# Patient Record
Sex: MALE | Race: WHITE | NOT HISPANIC OR LATINO | Employment: OTHER | URBAN - METROPOLITAN AREA
[De-identification: names, ages, dates, MRNs, and addresses within clinical notes are randomized per-mention and may not be internally consistent; named-entity substitution may affect disease eponyms.]

---

## 2017-11-30 ENCOUNTER — HOSPITAL ENCOUNTER (OUTPATIENT)
Facility: HOSPITAL | Age: 82
LOS: 1 days | Discharge: HOME | End: 2017-12-01
Attending: HOSPITALIST | Admitting: HOSPITALIST

## 2017-11-30 PROBLEM — N40.0 BPH (BENIGN PROSTATIC HYPERPLASIA): Status: ACTIVE | Noted: 2017-11-30

## 2017-11-30 PROBLEM — J44.9 COPD (CHRONIC OBSTRUCTIVE PULMONARY DISEASE) (HCC): Status: ACTIVE | Noted: 2017-11-30

## 2017-11-30 PROBLEM — Z90.5 HISTORY OF NEPHRECTOMY: Status: ACTIVE | Noted: 2017-11-30

## 2017-11-30 PROBLEM — E89.2 HYPOPARATHYROIDISM AFTER PROCEDURE (HCC): Status: ACTIVE | Noted: 2017-11-30

## 2017-11-30 PROBLEM — E89.0 STATUS POST COMPLETE THYROIDECTOMY: Status: ACTIVE | Noted: 2017-11-30

## 2017-11-30 PROBLEM — E89.2 POSTSURGICAL HYPOPARATHYROIDISM: Status: ACTIVE | Noted: 2017-11-30

## 2017-11-30 LAB
ALBUMIN SERPL-MCNC: 3.6 G/DL (ref 3.5–5.2)
ALBUMIN/GLOB SERPL: 1.1 G/DL
ALP SERPL-CCNC: 40 U/L (ref 39–117)
ALT SERPL W P-5'-P-CCNC: 16 U/L (ref 1–41)
ANION GAP SERPL CALCULATED.3IONS-SCNC: 11.9 MMOL/L
AST SERPL-CCNC: 23 U/L (ref 1–40)
BASOPHILS # BLD AUTO: 0 10*3/MM3 (ref 0–0.2)
BASOPHILS NFR BLD AUTO: 0 % (ref 0–1.5)
BILIRUB SERPL-MCNC: 0.3 MG/DL (ref 0.1–1.2)
BUN BLD-MCNC: 19 MG/DL (ref 8–23)
BUN/CREAT SERPL: 19.8 (ref 7–25)
CALCIUM SPEC-SCNC: 9.2 MG/DL (ref 8.2–9.6)
CHLORIDE SERPL-SCNC: 102 MMOL/L (ref 98–107)
CO2 SERPL-SCNC: 25.1 MMOL/L (ref 22–29)
CREAT BLD-MCNC: 0.96 MG/DL (ref 0.76–1.27)
DEPRECATED RDW RBC AUTO: 49.5 FL (ref 37–54)
EOSINOPHIL # BLD AUTO: 0 10*3/MM3 (ref 0–0.7)
EOSINOPHIL NFR BLD AUTO: 0 % (ref 0.3–6.2)
ERYTHROCYTE [DISTWIDTH] IN BLOOD BY AUTOMATED COUNT: 14.3 % (ref 11.5–14.5)
GFR SERPL CREATININE-BSD FRML MDRD: 74 ML/MIN/1.73
GLOBULIN UR ELPH-MCNC: 3.2 GM/DL
GLUCOSE BLD-MCNC: 151 MG/DL (ref 65–99)
HCT VFR BLD AUTO: 39.4 % (ref 40.4–52.2)
HGB BLD-MCNC: 12.4 G/DL (ref 13.7–17.6)
IMM GRANULOCYTES # BLD: 0 10*3/MM3 (ref 0–0.03)
IMM GRANULOCYTES NFR BLD: 0 % (ref 0–0.5)
LYMPHOCYTES # BLD AUTO: 0.6 10*3/MM3 (ref 0.9–4.8)
LYMPHOCYTES NFR BLD AUTO: 9.9 % (ref 19.6–45.3)
MAGNESIUM SERPL-MCNC: 1.6 MG/DL (ref 1.6–2.4)
MCH RBC QN AUTO: 30 PG (ref 27–32.7)
MCHC RBC AUTO-ENTMCNC: 31.5 G/DL (ref 32.6–36.4)
MCV RBC AUTO: 95.4 FL (ref 79.8–96.2)
MONOCYTES # BLD AUTO: 0.38 10*3/MM3 (ref 0.2–1.2)
MONOCYTES NFR BLD AUTO: 6.3 % (ref 5–12)
NEUTROPHILS # BLD AUTO: 5.09 10*3/MM3 (ref 1.9–8.1)
NEUTROPHILS NFR BLD AUTO: 83.8 % (ref 42.7–76)
PHOSPHATE SERPL-MCNC: 2.6 MG/DL (ref 2.5–4.5)
PLATELET # BLD AUTO: 180 10*3/MM3 (ref 140–500)
PMV BLD AUTO: 10.1 FL (ref 6–12)
POTASSIUM BLD-SCNC: 3.5 MMOL/L (ref 3.5–5.2)
PROT SERPL-MCNC: 6.8 G/DL (ref 6–8.5)
PTH-INTACT SERPL-MCNC: 13.3 PG/ML (ref 15–65)
RBC # BLD AUTO: 4.13 10*6/MM3 (ref 4.6–6)
SODIUM BLD-SCNC: 139 MMOL/L (ref 136–145)
WBC NRBC COR # BLD: 6.07 10*3/MM3 (ref 4.5–10.7)

## 2017-11-30 PROCEDURE — 80053 COMPREHEN METABOLIC PANEL: CPT | Performed by: HOSPITALIST

## 2017-11-30 PROCEDURE — 83970 ASSAY OF PARATHORMONE: CPT | Performed by: HOSPITALIST

## 2017-11-30 PROCEDURE — 85025 COMPLETE CBC W/AUTO DIFF WBC: CPT | Performed by: HOSPITALIST

## 2017-11-30 PROCEDURE — 84100 ASSAY OF PHOSPHORUS: CPT | Performed by: HOSPITALIST

## 2017-11-30 PROCEDURE — 83735 ASSAY OF MAGNESIUM: CPT | Performed by: HOSPITALIST

## 2017-11-30 RX ORDER — FENOFIBRATE 145 MG/1
160 TABLET, COATED ORAL DAILY
COMMUNITY
End: 2019-10-03 | Stop reason: SDUPTHER

## 2017-11-30 RX ORDER — LISINOPRIL 10 MG/1
10 TABLET ORAL DAILY
COMMUNITY
End: 2019-10-03 | Stop reason: SDUPTHER

## 2017-11-30 RX ORDER — ONDANSETRON 2 MG/ML
4 INJECTION INTRAMUSCULAR; INTRAVENOUS EVERY 6 HOURS PRN
Status: DISCONTINUED | OUTPATIENT
Start: 2017-11-30 | End: 2017-12-01 | Stop reason: HOSPADM

## 2017-11-30 RX ORDER — TAMSULOSIN HYDROCHLORIDE 0.4 MG/1
1 CAPSULE ORAL NIGHTLY
COMMUNITY

## 2017-11-30 RX ORDER — NITROGLYCERIN 0.4 MG/1
0.4 TABLET SUBLINGUAL
Status: DISCONTINUED | OUTPATIENT
Start: 2017-11-30 | End: 2017-12-01 | Stop reason: HOSPADM

## 2017-11-30 RX ORDER — OMEPRAZOLE 20 MG/1
20 CAPSULE, DELAYED RELEASE ORAL DAILY
COMMUNITY
End: 2021-04-23

## 2017-11-30 RX ORDER — LISINOPRIL 10 MG/1
10 TABLET ORAL DAILY
Status: DISCONTINUED | OUTPATIENT
Start: 2017-12-01 | End: 2017-12-01 | Stop reason: HOSPADM

## 2017-11-30 RX ORDER — PANTOPRAZOLE SODIUM 40 MG/1
40 TABLET, DELAYED RELEASE ORAL EVERY MORNING
Status: DISCONTINUED | OUTPATIENT
Start: 2017-12-01 | End: 2017-12-01 | Stop reason: HOSPADM

## 2017-11-30 RX ORDER — ACETAMINOPHEN 325 MG/1
650 TABLET ORAL EVERY 6 HOURS PRN
Status: DISCONTINUED | OUTPATIENT
Start: 2017-11-30 | End: 2017-12-01 | Stop reason: HOSPADM

## 2017-11-30 RX ORDER — TAMSULOSIN HYDROCHLORIDE 0.4 MG/1
0.4 CAPSULE ORAL NIGHTLY
Status: DISCONTINUED | OUTPATIENT
Start: 2017-12-01 | End: 2017-12-01 | Stop reason: HOSPADM

## 2017-11-30 RX ORDER — CALCIUM CARBONATE 200(500)MG
2 TABLET,CHEWABLE ORAL 4 TIMES DAILY
Status: DISCONTINUED | OUTPATIENT
Start: 2017-11-30 | End: 2017-12-01 | Stop reason: HOSPADM

## 2017-12-01 VITALS
RESPIRATION RATE: 16 BRPM | SYSTOLIC BLOOD PRESSURE: 145 MMHG | TEMPERATURE: 97.6 F | HEIGHT: 70 IN | BODY MASS INDEX: 20.31 KG/M2 | OXYGEN SATURATION: 97 % | HEART RATE: 75 BPM | DIASTOLIC BLOOD PRESSURE: 72 MMHG | WEIGHT: 141.9 LBS

## 2017-12-01 PROBLEM — E83.51 HYPOCALCEMIA: Status: ACTIVE | Noted: 2017-12-01

## 2017-12-01 LAB
ANION GAP SERPL CALCULATED.3IONS-SCNC: 9.1 MMOL/L
BUN BLD-MCNC: 17 MG/DL (ref 8–23)
BUN/CREAT SERPL: 17.5 (ref 7–25)
CALCIUM SPEC-SCNC: 9.5 MG/DL (ref 8.2–9.6)
CHLORIDE SERPL-SCNC: 103 MMOL/L (ref 98–107)
CO2 SERPL-SCNC: 25.9 MMOL/L (ref 22–29)
CREAT BLD-MCNC: 0.97 MG/DL (ref 0.76–1.27)
GFR SERPL CREATININE-BSD FRML MDRD: 73 ML/MIN/1.73
GLUCOSE BLD-MCNC: 97 MG/DL (ref 65–99)
POTASSIUM BLD-SCNC: 3.6 MMOL/L (ref 3.5–5.2)
PTH-INTACT SERPL-MCNC: 12.1 PG/ML (ref 15–65)
SODIUM BLD-SCNC: 138 MMOL/L (ref 136–145)

## 2017-12-01 PROCEDURE — G0378 HOSPITAL OBSERVATION PER HR: HCPCS

## 2017-12-01 PROCEDURE — 83970 ASSAY OF PARATHORMONE: CPT | Performed by: HOSPITALIST

## 2017-12-01 PROCEDURE — 99204 OFFICE O/P NEW MOD 45 MIN: CPT | Performed by: INTERNAL MEDICINE

## 2017-12-01 PROCEDURE — 80048 BASIC METABOLIC PNL TOTAL CA: CPT | Performed by: HOSPITALIST

## 2017-12-01 RX ORDER — LEVOTHYROXINE SODIUM 0.07 MG/1
75 TABLET ORAL DAILY
Qty: 30 TABLET | Refills: 0 | Status: SHIPPED | OUTPATIENT
Start: 2017-12-01 | End: 2021-10-08 | Stop reason: ALTCHOICE

## 2017-12-01 RX ORDER — LEVOTHYROXINE SODIUM 0.07 MG/1
75 TABLET ORAL
Status: DISCONTINUED | OUTPATIENT
Start: 2017-12-01 | End: 2017-12-01 | Stop reason: HOSPADM

## 2017-12-01 RX ORDER — CALCIUM CARBONATE 200(500)MG
2 TABLET,CHEWABLE ORAL 4 TIMES DAILY
Start: 2017-12-01 | End: 2021-04-23

## 2017-12-01 RX ADMIN — Medication 2 TABLET: at 01:31

## 2017-12-01 RX ADMIN — LISINOPRIL 10 MG: 10 TABLET ORAL at 09:04

## 2017-12-01 RX ADMIN — TAMSULOSIN HYDROCHLORIDE 0.4 MG: 0.4 CAPSULE ORAL at 01:31

## 2017-12-01 RX ADMIN — Medication 2 TABLET: at 12:10

## 2017-12-01 RX ADMIN — LEVOTHYROXINE SODIUM 75 MCG: 75 TABLET ORAL at 10:20

## 2017-12-01 RX ADMIN — PANTOPRAZOLE SODIUM 40 MG: 40 TABLET, DELAYED RELEASE ORAL at 06:03

## 2017-12-01 RX ADMIN — Medication 2 TABLET: at 09:04

## 2017-12-01 NOTE — PROGRESS NOTES
Case Management Discharge Note    Final Note: Discharged  12/1/17 to home    Discharge Placement     No information found        Other: Other (family provided transportation at discharge)    Discharge Codes: 01  Discharge to home

## 2017-12-01 NOTE — PROGRESS NOTES
Discharge Planning Assessment  [facility]     Patient Name: Jase Cardoza  MRN: [id number]  Today's Date: 12/1/2017    Admit Date: 11/30/2017          Discharge Needs Assessment       12/01/17 0933    Living Environment    Lives With alone    Living Arrangements house   single story    Home Accessibility no concerns;stairs to enter home    Number of Stairs to Enter Home 3    Type of Financial/Environmental Concern none    Transportation Available car;family or friend will provide    Living Environment    Provides Primary Care For no one    Quality Of Family Relationships supportive    Able to Return to Prior Living Arrangements yes    Discharge Needs Assessment    Concerns To Be Addressed no discharge needs identified;denies needs/concerns at this time    Readmission Within The Last 30 Days no previous admission in last 30 days    Anticipated Changes Related to Illness none    Equipment Currently Used at Home cane, straight    Equipment Needed After Discharge none    Discharge Disposition home or self-care            Discharge Plan       12/01/17 0994    Case Management/Social Work Plan    Plan Home with family assistance    Patient/Family In Agreement With Plan yes    Additional Comments Introduced self and explained role of CCP and facesheet verified with patient, who is alert and oriented x 4, and Cora francis, with patient's permission, at bedside.  Patient states he lives alone in a single story house and is independent with all ADLs including medications, driving, cooking, ambulation, and states his daughter assist with housekeeping as needed.  Patient states he has never used home health or SNF and denies need for home health or SNF or DME at discharge and states he has a straight cane, but never uses it.  Patient states he uses [facility] in [place], [place] and denies any issues with affording or obtaining medications.  Patient and daughter state the plan is for patient to return home and she and  her family live within 1 block of patient and will be there to assist and check on him frequently throughout the day at discharge and she will provide transportation at discharge.....Danae Jurado RN,[facility]         Discharge Placement     No information found                Demographic Summary       12/01/17 0932    Referral Information    Arrived From home or self-care    Contact Information    Permission Granted to Share Information With family/designee   Cora Duffy(daughter)  [phone]    Primary Care Physician Information    Name MARIAM Wise MD            Functional Status       12/01/17 0932    Functional Status Current    Ambulation 0-->independent    Transferring 0-->independent    Toileting 0-->independent    Bathing 0-->independent    Dressing 0-->independent    Eating 0-->independent    Communication 0-->understands/communicates without difficulty    Change in Functional Status Since Onset of Current Illness/Injury no    Functional Status Prior    Ambulation 0-->independent    Transferring 0-->independent    Toileting 0-->independent    Bathing 0-->independent    Dressing 0-->independent    Eating 0-->independent    Communication 0-->understands/communicates without difficulty    IADL    Medications independent    Meal Preparation independent    Housekeeping assistive person    Laundry independent    Shopping independent    Oral Care independent    Activity Tolerance    Current Activity Limitations none    Usual Activity Tolerance good    Current Activity Tolerance good    Cognitive/Perceptual/Developmental    Current Mental Status/Cognitive Functioning no deficits noted    Recent Changes in Mental Status/Cognitive Functioning no changes    Developmental Stage (Eriksson's Stages of Development) Stage 8 (65 years-death/Late Adulthood) Integrity vs. Despair            Psychosocial     None            Abuse/Neglect     None            Legal     None            Substance Abuse     None            Patient  Forms     None          Danae Jurado RN

## 2017-12-01 NOTE — DISCHARGE SUMMARY
[facility]    Date of Discharge:  12/1/2017    PCP: Oxana Wise MD    Discharge Diagnosis:   Active Hospital Problems (** Indicates Principal Problem)    Diagnosis Date Noted   • **Postsurgical hypoparathyroidism [E89.2] 11/30/2017   • Hypocalcemia [E83.51] 12/01/2017   • Status post complete thyroidectomy [E89.0] 11/30/2017   • COPD (chronic obstructive pulmonary disease) [J44.9] 11/30/2017   • BPH (benign prostatic hyperplasia) [N40.0] 11/30/2017   • History of nephrectomy [Z90.5] 11/30/2017      Resolved Hospital Problems    Diagnosis Date Noted Date Resolved   No resolved problems to display.     Procedures Performed       Consulting Physician(s)     Provider Relationship Specialty    Rashaad EAST MD Consulting Physician Endocrinology    Cristobal Mchugh MD Consulting Physician Otolaryngology        Hospital Course  Please see history and physical for details. Patient is a 89 y.o. male initially admitted by one of my partners Dr. No.  Patient apparently had right-sided hemithyroidectomy performed in an outpatient setting by .  Pathology from previous partial thyroidectomy demonstrated carcinoma and endocrinology recommended for total thyroidectomy and postoperative radioactive iodine treatments.  Total thyroidectomy was performed and apparently patient had some abnormal parathyroid hormone levels so DR Arredondo asked that he be admitted to the hospital for observation.  Despite abnormalities on PTH his calcium levels are normal and this is secondary to the fact that he takes calcium at least 4 times daily by mouth.  He's voicing no complaints at this juncture does not have any issues with dysphagia nor is he having any nausea vomiting headache fever chills or night sweats.  He is not having issues with confusion either and is interactive and pleasant.  No family present at bedside nurse is present at bedside the case was  discussed.  At this juncture endocrinology did see the patient in consultation and appreciate the assistance of Dr. Collier.  He has been supplemented on level thyroxine at 75 µg and should continue with use of Tums 4 times daily.  Patient artery has scheduled follow-up with his endocrinologist in the next 10 days and also has scheduled follow-up with his surgeon as well.  Plans per further his current tube to be removed by home health so I asked the RN to contact  to inform him of output indeterminate during can be removed prior to disposition.  All questions were answered to patient prior to disposition and is very thankful amenable to the above plan.    Condition on Discharge: Improved    Temp:  [97.6 °F (36.4 °C)-98.2 °F (36.8 °C)] 97.6 °F (36.4 °C)  Heart Rate:  [69-79] 75  Resp:  [16] 16  BP: (132-163)/(69-87) 145/72  Body mass index is 20.36 kg/(m^2).    Physical Exam   Constitutional: He is oriented to person, place, and time. He appears well-developed.   Eating lunch without any signs of dysphagia   Neck:   Previous surgical scar noted anterior portion of the neck that is healing appropriately and it is clean and dry without signs of infection.  Patient still has a drain in place with minimal serosanguineous output   Cardiovascular: Normal rate and regular rhythm.    Pulmonary/Chest: Effort normal and breath sounds normal.   Abdominal: Soft. Bowel sounds are normal. He exhibits no distension. There is no tenderness.   Musculoskeletal: He exhibits no edema.   Neurological: He is alert and oriented to person, place, and time. No cranial nerve deficit.     Discharge Medications   Jase Cardoza   Home Medication Instructions JENNIFER:[id number]    Printed on:12/01/17 1323   Medication Information                      calcium carbonate (TUMS) 500 MG chewable tablet  Chew 1,000 mg 4 (Four) Times a Day.             fenofibrate (TRICOR) 145 MG tablet  Take 160 mg by mouth Daily.              levothyroxine (SYNTHROID, LEVOTHROID) 75 MCG tablet  Take 1 tablet by mouth Daily.             lisinopril (PRINIVIL,ZESTRIL) 10 MG tablet  Take 10 mg by mouth Daily.             omeprazole (priLOSEC) 20 MG capsule  Take 20 mg by mouth Daily.             tamsulosin (FLOMAX) 0.4 MG capsule 24 hr capsule  Take 1 capsule by mouth Every Night.                  Additional Instructions for the Follow-ups that You Need to Schedule     Discharge Follow-up with PCP    As directed    Follow Up Details:  keep all appts prior to this admission             Follow-up Information     Follow up with Oxana Wise MD .    Specialty:  Family Medicine    Why:  keep all appts prior to this admission    Contact information:    [address] [facility] [address] [phone]          Test Results Pending at Discharge     Ozzie Miranda MD  12/01/17  1:23 PM    Discharge time spent greater than 30 minutes.

## 2017-12-01 NOTE — PLAN OF CARE
Problem: Patient Care Overview (Adult)  Goal: Plan of Care Review  Outcome: Ongoing (interventions implemented as appropriate)    12/01/17 0040 12/01/17 0217   Patient Care Overview   Progress --  no change   Outcome Evaluation   Outcome Summary/Follow up Plan --  pt caome to the floor aroufn 2100 last night. Pt hasnt complained of any pain. Drain has little drainage and tube has been changed Q4 hr. PTH lvl has is now 13.3 and called in to Doc. Willl continue to monitor.    Coping/Psychosocial Response Interventions   Plan Of Care Reviewed With patient --        Goal: Adult Individualization and Mutuality  Outcome: Ongoing (interventions implemented as appropriate)  Goal: Discharge Needs Assessment  Outcome: Ongoing (interventions implemented as appropriate)    Problem: Fall Risk (Adult)  Goal: Identify Related Risk Factors and Signs and Symptoms  Outcome: Ongoing (interventions implemented as appropriate)  Goal: Absence of Falls  Outcome: Ongoing (interventions implemented as appropriate)    Problem: Infection, Risk/Actual (Adult)  Goal: Identify Related Risk Factors and Signs and Symptoms  Outcome: Ongoing (interventions implemented as appropriate)  Goal: Infection Prevention/Resolution  Outcome: Ongoing (interventions implemented as appropriate)

## 2017-12-01 NOTE — CONSULTS
89 y.o.  Patient Care Team:  No Known Provider as PCP - General      No chief complaint on file.  Postoperative hypoparathyroidism, papillary thyroid cancer, status post completion thyroidectomy and postoperative hypothyroidism    HPI   Patient is a 89-year-old white male presented to the hospital after having elective surgery yesterday.  He had a completion thyroidectomy and is intraoperative PTH was noted to be low.  Patient's calcium is stable but due to concerns for hypocalcemia postoperatively patient was admitted to the hospital for further monitoring  Patient's PTH was 13 intraoperatively    Patient reports that in July this year he was noted to have a right-sided thyroid nodule which was further evaluated by FNA in the malignancy was suspected and he underwent right hemithyroidectomy on September 14, 2017.  Subsequently he was found to have papillary thyroid cancer and was recommended completion thyroidectomy.  Patient wanted a second opinion and he saw an endocrinologist Dr. Reynaga who apparently recommended completion thyroidectomy along with radioiodine ablation in the near future.    Patient underwent completion thyroidectomy yesterday.  He denies any knowledge of calcium imbalance prior to surgery.  He also denies any knowledge of hypoparathyroidism prior to surgery  Patient denies any history of kidney stones.  Currently patient denies any symptoms of paresthesias or muscle spasms.  Patient has been ambulatory since surgery and has been walking around the nursing Station several times during the day  Patient currently has a drain tube from the surgical site.  Patient wants to go home today        Past Medical History:   Diagnosis Date   • Arthritis    • Cancer    • COPD (chronic obstructive pulmonary disease)    • Disease of thyroid gland    • Kidney calculi    • Prostate enlargement        History reviewed. No pertinent family history.    Social History     Social History   • Marital status:       Spouse name: N/A   • Number of children: N/A   • Years of education: N/A     Occupational History   • Not on file.     Social History Main Topics   • Smoking status: Former Smoker     Types: Cigarettes   • Smokeless tobacco: Not on file      Comment: quit 70 years ago   • Alcohol use No   • Drug use: No   • Sexual activity: Defer     Other Topics Concern   • Not on file     Social History Narrative   • No narrative on file       Allergies   Allergen Reactions   • Sulfa Antibiotics Rash         Current Facility-Administered Medications:   •  acetaminophen (TYLENOL) tablet 650 mg, 650 mg, Oral, Q6H PRN, Brian No MD  •  calcium carbonate (TUMS) chewable tablet 500 mg (200 mg elemental), 2 tablet, Oral, 4x Daily, Brian No MD, 2 tablet at 12/01/17 0131  •  lisinopril (PRINIVIL,ZESTRIL) tablet 10 mg, 10 mg, Oral, Daily, Brian No MD  •  nitroglycerin (NITROSTAT) SL tablet 0.4 mg, 0.4 mg, Sublingual, Q5 Min PRN, Brian No MD  •  ondansetron (ZOFRAN) injection 4 mg, 4 mg, Intravenous, Q6H PRN, Brian No MD  •  pantoprazole (PROTONIX) EC tablet 40 mg, 40 mg, Oral, QAM, Brian No MD, 40 mg at 12/01/17 0603  •  tamsulosin (FLOMAX) 24 hr capsule 0.4 mg, 0.4 mg, Oral, Nightly, Brian No MD, 0.4 mg at 12/01/17 0131         Review of Systems   Constitutional: Negative.    HENT: Positive for trouble swallowing.    Eyes: Negative.    Respiratory: Negative.    Cardiovascular: Negative.    Gastrointestinal: Negative.    Musculoskeletal: Negative.    Skin: Negative.    Neurological: Negative.    Psychiatric/Behavioral: Negative.    All other systems reviewed and are negative.    Objective     Vital Signs  Temp:  [97.8 °F (36.6 °C)-98.2 °F (36.8 °C)] 98.2 °F (36.8 °C)  Heart Rate:  [69-79] 69  Resp:  [16] 16  BP: (132-163)/(69-87) 140/81    Physical Exam  Physical Exam   Constitutional: He is oriented to person, place, and time. He appears well-developed and well-nourished.   HENT:   Head: Normocephalic  and atraumatic.   Eyes: EOM are normal. Pupils are equal, round, and reactive to light.   Neck: Normal range of motion. Neck supple.   Cardiovascular: Normal rate, regular rhythm, normal heart sounds and intact distal pulses.    Pulmonary/Chest: Effort normal and breath sounds normal. No stridor.   Abdominal: Soft. Bowel sounds are normal. He exhibits no distension. There is no tenderness.   Musculoskeletal: Normal range of motion. He exhibits no edema.   Neurological: He is alert and oriented to person, place, and time.   Skin: Skin is warm and dry.   Psychiatric: He has a normal mood and affect. His behavior is normal.   Nursing note and vitals reviewed.      Results Review:    I reviewed the patient's new clinical results.  No results found for: POCGLU  Lab Results (last 72 hours)     Procedure Component Value Units Date/Time    CBC Auto Differential [[id number]]  (Abnormal) Collected:  11/30/17 2302    Specimen:  Blood Updated:  11/30/17 2318     WBC 6.07 10*3/mm3      RBC 4.13 (L) 10*6/mm3      Hemoglobin 12.4 (L) g/dL      Hematocrit 39.4 (L) %      MCV 95.4 fL      MCH 30.0 pg      MCHC 31.5 (L) g/dL      RDW 14.3 %      RDW-SD 49.5 fl      MPV 10.1 fL      Platelets 180 10*3/mm3      Neutrophil % 83.8 (H) %      Lymphocyte % 9.9 (L) %      Monocyte % 6.3 %      Eosinophil % 0.0 (L) %      Basophil % 0.0 %      Immature Grans % 0.0 %      Neutrophils, Absolute 5.09 10*3/mm3      Lymphocytes, Absolute 0.60 (L) 10*3/mm3      Monocytes, Absolute 0.38 10*3/mm3      Eosinophils, Absolute 0.00 10*3/mm3      Basophils, Absolute 0.00 10*3/mm3      Immature Grans, Absolute 0.00 10*3/mm3     Magnesium [[id number]]  (Normal) Collected:  11/30/17 2302    Specimen:  Blood Updated:  11/30/17 2332     Magnesium 1.6 mg/dL     Comprehensive Metabolic Panel [[id number]]  (Abnormal) Collected:  11/30/17 2302    Specimen:  Blood Updated:  11/30/17 2332     Glucose 151 (H) mg/dL      BUN 19 mg/dL      Creatinine 0.96 mg/dL       Sodium 139 mmol/L      Potassium 3.5 mmol/L      Chloride 102 mmol/L      CO2 25.1 mmol/L      Calcium 9.2 mg/dL      Total Protein 6.8 g/dL      Albumin 3.60 g/dL      ALT (SGPT) 16 U/L      AST (SGOT) 23 U/L      Alkaline Phosphatase 40 U/L      Total Bilirubin 0.3 mg/dL      eGFR Non African Amer 74 mL/min/1.73      Globulin 3.2 gm/dL      A/G Ratio 1.1 g/dL      BUN/Creatinine Ratio 19.8     Anion Gap 11.9 mmol/L     Narrative:       The MDRD GFR formula is only valid for adults with stable renal function between ages 18 and 70.    Phosphorus [[id number]]  (Normal) Collected:  11/30/17 2302    Specimen:  Blood Updated:  11/30/17 2332     Phosphorus 2.6 mg/dL     PTH, Intact [[id number]]  (Abnormal) Collected:  11/30/17 2302    Specimen:  Blood Updated:  11/30/17 2341     PTH, Intact 13.3 (L) pg/mL     Basic Metabolic Panel [[id number]] Collected:  12/01/17 0412    Specimen:  Blood Updated:  12/01/17 0502     Glucose 97 mg/dL      BUN 17 mg/dL      Creatinine 0.97 mg/dL      Sodium 138 mmol/L      Potassium 3.6 mmol/L      Chloride 103 mmol/L      CO2 25.9 mmol/L      Calcium 9.5 mg/dL      eGFR Non African Amer 73 mL/min/1.73      BUN/Creatinine Ratio 17.5     Anion Gap 9.1 mmol/L     Narrative:       The MDRD GFR formula is only valid for adults with stable renal function between ages 18 and 70.    PTH, Intact [[id number]]  (Abnormal) Collected:  12/01/17 0412    Specimen:  Blood Updated:  12/01/17 0517     PTH, Intact 12.1 (L) pg/mL           Imaging Results (last 72 hours)     ** No results found for the last 72 hours. **          Assessment/Plan     Patient Active Problem List   Diagnosis   • Status post complete thyroidectomy   • Postsurgical hypoparathyroidism   • COPD (chronic obstructive pulmonary disease)   • BPH (benign prostatic hyperplasia)   • History of nephrectomy   Postoperative hypothyroidism  Papillary thyroid cancer    Patient's calcium is stable at 9.5 this morning.  Patient is asymptomatic even  "though his PTH levels are in the tall and 13 range postoperatively  Patient is currently on calcium replacement receiving Tums 2 tablets every 6 hours    I discussed further management of postoperative hypoparathyroidism and possible hypocalcemia with the patient  For the time being he might continue the Tums.    If the calcium continues to drop then he may need further medication including Sensipar, high-dose vitamin D, NATPARA as other options    Postoperative hypothyroidism  Patient will most certainly replacement lifelong  TSH suppression from the goals discussed with the patient  Further decisions can be made based on surgical pathology which is still pending  Patient also believes that he will undergo radioactive iodine ablation in 5-6 weeks through his primary endocrinologist  Patient has a follow-up appointment on December 12, 2017 with his endocrinologist    Until then I recommend that he start levothyroxine 75 µg daily on an empty stomach  He will continue Tums 2 tablets 4 times daily starting after breakfast  Patient is advised to immediately report if he is symptomatic of tingling or numbness over the face or arms or legs and also if he has any muscle spasms  Patient verbalized understanding    I recommend checking calcium levels at noon today and if greater than 9.0 then he may be able to go home on current medication  Patient is advised to follow-up with his endocrinologist as scheduled in the next 10 days    Thank you for the consult    Rashaad Carbajal MD FACE.  12/01/17  8:45 AM        EMR Dragon / transcription disclaimer:     \"Dictated utilizing Dragon dictation\".   "

## 2017-12-01 NOTE — PLAN OF CARE
Problem: Patient Care Overview (Adult)  Goal: Plan of Care Review  Outcome: Ongoing (interventions implemented as appropriate)    12/01/17 1416   Patient Care Overview   Progress improving   Outcome Evaluation   Outcome Summary/Follow up Plan plan to discharge home   Coping/Psychosocial Response Interventions   Plan Of Care Reviewed With patient;family       Goal: Adult Individualization and Mutuality  Outcome: Ongoing (interventions implemented as appropriate)  Goal: Discharge Needs Assessment  Outcome: Ongoing (interventions implemented as appropriate)    Problem: Fall Risk (Adult)  Goal: Identify Related Risk Factors and Signs and Symptoms  Outcome: Ongoing (interventions implemented as appropriate)  Goal: Absence of Falls  Outcome: Ongoing (interventions implemented as appropriate)    Problem: Infection, Risk/Actual (Adult)  Goal: Identify Related Risk Factors and Signs and Symptoms  Outcome: Ongoing (interventions implemented as appropriate)  Goal: Infection Prevention/Resolution  Outcome: Ongoing (interventions implemented as appropriate)

## 2018-12-25 ENCOUNTER — ANESTHESIA (OUTPATIENT)
Dept: PERIOP | Facility: HOSPITAL | Age: 83
End: 2018-12-25

## 2018-12-25 PROCEDURE — C1713 ANCHOR/SCREW BN/BN,TIS/BN: HCPCS | Performed by: ORTHOPAEDIC SURGERY

## 2018-12-25 DEVICE — KWIRE THRD TROC/TP 1.6X5X150MM NS: Type: IMPLANTABLE DEVICE | Site: OLECRANON | Status: FUNCTIONAL

## 2018-12-25 DEVICE — SUT FW #2 W/TPR NDL 1/2 CIR 38IN 97CM 26.5MM BLU: Type: IMPLANTABLE DEVICE | Site: OLECRANON | Status: FUNCTIONAL

## 2018-12-26 ENCOUNTER — APPOINTMENT (OUTPATIENT)
Dept: GENERAL RADIOLOGY | Facility: HOSPITAL | Age: 83
End: 2018-12-26

## 2018-12-26 ENCOUNTER — HOSPITAL ENCOUNTER (INPATIENT)
Facility: HOSPITAL | Age: 83
LOS: 3 days | Discharge: HOME | End: 2018-12-29
Attending: EMERGENCY MEDICINE | Admitting: INTERNAL MEDICINE

## 2018-12-26 ENCOUNTER — APPOINTMENT (OUTPATIENT)
Dept: CT IMAGING | Facility: HOSPITAL | Age: 83
End: 2018-12-26

## 2018-12-26 DIAGNOSIS — S22.31XA CLOSED FRACTURE OF ONE RIB OF RIGHT SIDE, INITIAL ENCOUNTER: ICD-10-CM

## 2018-12-26 DIAGNOSIS — S66.911A MUSCLE STRAIN OF RIGHT WRIST, INITIAL ENCOUNTER: ICD-10-CM

## 2018-12-26 DIAGNOSIS — S52.021B: ICD-10-CM
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
## 2018-12-26 DIAGNOSIS — S22.22XA CLOSED FRACTURE OF BODY OF STERNUM, INITIAL ENCOUNTER: ICD-10-CM

## 2018-12-26 LAB
ALBUMIN SERPL-MCNC: 3.6 G/DL (ref 3.5–5.2)
ALBUMIN/GLOB SERPL: 1 G/DL
ALP SERPL-CCNC: 44 U/L (ref 39–117)
ALT SERPL W P-5'-P-CCNC: 17 U/L (ref 1–41)
ANION GAP SERPL CALCULATED.3IONS-SCNC: 12.8 MMOL/L
AST SERPL-CCNC: 30 U/L (ref 1–40)
BASOPHILS # BLD AUTO: 0.01 10*3/MM3 (ref 0–0.2)
BASOPHILS NFR BLD AUTO: 0.1 % (ref 0–1.5)
BILIRUB SERPL-MCNC: 0.4 MG/DL (ref 0.1–1.2)
BUN BLD-MCNC: 22 MG/DL (ref 8–23)
BUN/CREAT SERPL: 19.5 (ref 7–25)
CALCIUM SPEC-SCNC: 9.1 MG/DL (ref 8.2–9.6)
CHLORIDE SERPL-SCNC: 104 MMOL/L (ref 98–107)
CO2 SERPL-SCNC: 25.2 MMOL/L (ref 22–29)
CREAT BLD-MCNC: 1.13 MG/DL (ref 0.76–1.27)
DEPRECATED RDW RBC AUTO: 52.3 FL (ref 37–54)
EOSINOPHIL # BLD AUTO: 0.03 10*3/MM3 (ref 0–0.7)
EOSINOPHIL NFR BLD AUTO: 0.4 % (ref 0.3–6.2)
ERYTHROCYTE [DISTWIDTH] IN BLOOD BY AUTOMATED COUNT: 15.1 % (ref 11.5–14.5)
GFR SERPL CREATININE-BSD FRML MDRD: 61 ML/MIN/1.73
GLOBULIN UR ELPH-MCNC: 3.6 GM/DL
GLUCOSE BLD-MCNC: 102 MG/DL (ref 65–99)
HCT VFR BLD AUTO: 38.2 % (ref 40.4–52.2)
HGB BLD-MCNC: 11.6 G/DL (ref 13.7–17.6)
IMM GRANULOCYTES # BLD AUTO: 0.02 10*3/MM3 (ref 0–0.03)
IMM GRANULOCYTES NFR BLD AUTO: 0.3 % (ref 0–0.5)
LYMPHOCYTES # BLD AUTO: 0.56 10*3/MM3 (ref 0.9–4.8)
LYMPHOCYTES NFR BLD AUTO: 7.5 % (ref 19.6–45.3)
MCH RBC QN AUTO: 28.7 PG (ref 27–32.7)
MCHC RBC AUTO-ENTMCNC: 30.4 G/DL (ref 32.6–36.4)
MCV RBC AUTO: 94.6 FL (ref 79.8–96.2)
MONOCYTES # BLD AUTO: 0.73 10*3/MM3 (ref 0.2–1.2)
MONOCYTES NFR BLD AUTO: 9.8 % (ref 5–12)
NEUTROPHILS # BLD AUTO: 6.1 10*3/MM3 (ref 1.9–8.1)
NEUTROPHILS NFR BLD AUTO: 81.9 % (ref 42.7–76)
PLATELET # BLD AUTO: 224 10*3/MM3 (ref 140–500)
PMV BLD AUTO: 9.6 FL (ref 6–12)
POTASSIUM BLD-SCNC: 3.9 MMOL/L (ref 3.5–5.2)
PROT SERPL-MCNC: 7.2 G/DL (ref 6–8.5)
RBC # BLD AUTO: 4.04 10*6/MM3 (ref 4.6–6)
SODIUM BLD-SCNC: 142 MMOL/L (ref 136–145)
TROPONIN T SERPL-MCNC: <0.01 NG/ML (ref 0–0.03)
WBC NRBC COR # BLD: 7.45 10*3/MM3 (ref 4.5–10.7)

## 2018-12-26 PROCEDURE — 80053 COMPREHEN METABOLIC PANEL: CPT | Performed by: EMERGENCY MEDICINE

## 2018-12-26 PROCEDURE — 71101 X-RAY EXAM UNILAT RIBS/CHEST: CPT

## 2018-12-26 PROCEDURE — 71120 X-RAY EXAM BREASTBONE 2/>VWS: CPT

## 2018-12-26 PROCEDURE — 85025 COMPLETE CBC W/AUTO DIFF WBC: CPT | Performed by: EMERGENCY MEDICINE

## 2018-12-26 PROCEDURE — 99285 EMERGENCY DEPT VISIT HI MDM: CPT

## 2018-12-26 PROCEDURE — 73070 X-RAY EXAM OF ELBOW: CPT

## 2018-12-26 PROCEDURE — 25010000002 ONDANSETRON PER 1 MG: Performed by: EMERGENCY MEDICINE

## 2018-12-26 PROCEDURE — 73110 X-RAY EXAM OF WRIST: CPT

## 2018-12-26 PROCEDURE — 84484 ASSAY OF TROPONIN QUANT: CPT | Performed by: EMERGENCY MEDICINE

## 2018-12-26 PROCEDURE — 25010000003 CEFAZOLIN 1-4 GM/50ML-% SOLUTION: Performed by: EMERGENCY MEDICINE

## 2018-12-26 PROCEDURE — 73030 X-RAY EXAM OF SHOULDER: CPT

## 2018-12-26 PROCEDURE — 25010000002 MORPHINE (PF) 10 MG/ML SOLUTION: Performed by: EMERGENCY MEDICINE

## 2018-12-26 PROCEDURE — 71250 CT THORAX DX C-: CPT

## 2018-12-26 PROCEDURE — 93005 ELECTROCARDIOGRAM TRACING: CPT | Performed by: EMERGENCY MEDICINE

## 2018-12-26 PROCEDURE — 93010 ELECTROCARDIOGRAM REPORT: CPT | Performed by: INTERNAL MEDICINE

## 2018-12-26 RX ORDER — SODIUM CHLORIDE 0.9 % (FLUSH) 0.9 %
10 SYRINGE (ML) INJECTION AS NEEDED
Status: DISCONTINUED | OUTPATIENT
Start: 2018-12-26 | End: 2018-12-29 | Stop reason: HOSPADM

## 2018-12-26 RX ORDER — CEFAZOLIN SODIUM 1 G/50ML
1 INJECTION, SOLUTION INTRAVENOUS ONCE
Status: COMPLETED | OUTPATIENT
Start: 2018-12-26 | End: 2018-12-26

## 2018-12-26 RX ORDER — MORPHINE SULFATE 2 MG/ML
2 INJECTION, SOLUTION INTRAMUSCULAR; INTRAVENOUS ONCE
Status: COMPLETED | OUTPATIENT
Start: 2018-12-26 | End: 2018-12-26

## 2018-12-26 RX ORDER — ONDANSETRON 2 MG/ML
4 INJECTION INTRAMUSCULAR; INTRAVENOUS ONCE
Status: COMPLETED | OUTPATIENT
Start: 2018-12-26 | End: 2018-12-26

## 2018-12-26 RX ADMIN — CEFAZOLIN SODIUM 1 G: 1 INJECTION, SOLUTION INTRAVENOUS at 23:13

## 2018-12-26 RX ADMIN — ONDANSETRON 4 MG: 2 INJECTION INTRAMUSCULAR; INTRAVENOUS at 23:13

## 2018-12-26 RX ADMIN — MORPHINE SULFATE 2 MG: 10 INJECTION INTRAVENOUS at 23:13

## 2018-12-27 ENCOUNTER — APPOINTMENT (OUTPATIENT)
Dept: GENERAL RADIOLOGY | Facility: HOSPITAL | Age: 83
End: 2018-12-27

## 2018-12-27 ENCOUNTER — ANESTHESIA EVENT (OUTPATIENT)
Dept: PERIOP | Facility: HOSPITAL | Age: 83
End: 2018-12-27

## 2018-12-27 PROBLEM — S52.021B: Status: ACTIVE | Noted: 2018-12-27

## 2018-12-27 PROBLEM — I10 ESSENTIAL HYPERTENSION: Status: ACTIVE | Noted: 2018-12-27

## 2018-12-27 PROBLEM — E89.0 POST-SURGICAL HYPOTHYROIDISM: Status: ACTIVE | Noted: 2018-12-27

## 2018-12-27 PROBLEM — S22.20XA CLOSED FRACTURE OF STERNUM: Status: ACTIVE | Noted: 2018-12-27

## 2018-12-27 PROCEDURE — 25010000002 PROPOFOL 10 MG/ML EMULSION: Performed by: ANESTHESIOLOGY

## 2018-12-27 PROCEDURE — 94640 AIRWAY INHALATION TREATMENT: CPT

## 2018-12-27 PROCEDURE — 97110 THERAPEUTIC EXERCISES: CPT | Performed by: OCCUPATIONAL THERAPIST

## 2018-12-27 PROCEDURE — 94799 UNLISTED PULMONARY SVC/PX: CPT

## 2018-12-27 PROCEDURE — 0PSK04Z REPOSITION RIGHT ULNA WITH INTERNAL FIXATION DEVICE, OPEN APPROACH: ICD-10-PCS | Performed by: ORTHOPAEDIC SURGERY

## 2018-12-27 PROCEDURE — 97110 THERAPEUTIC EXERCISES: CPT

## 2018-12-27 PROCEDURE — 90471 IMMUNIZATION ADMIN: CPT | Performed by: EMERGENCY MEDICINE

## 2018-12-27 PROCEDURE — 25010000002 FENTANYL CITRATE (PF) 100 MCG/2ML SOLUTION: Performed by: ANESTHESIOLOGY

## 2018-12-27 PROCEDURE — 90715 TDAP VACCINE 7 YRS/> IM: CPT | Performed by: EMERGENCY MEDICINE

## 2018-12-27 PROCEDURE — 25010000002 TDAP 5-2.5-18.5 LF-MCG/0.5 SUSPENSION: Performed by: EMERGENCY MEDICINE

## 2018-12-27 PROCEDURE — 97162 PT EVAL MOD COMPLEX 30 MIN: CPT

## 2018-12-27 PROCEDURE — 25010000002 MIDAZOLAM PER 1 MG

## 2018-12-27 PROCEDURE — 25010000003 CEFAZOLIN IN DEXTROSE 2-4 GM/100ML-% SOLUTION: Performed by: ORTHOPAEDIC SURGERY

## 2018-12-27 PROCEDURE — 25010000003 CEFAZOLIN IN DEXTROSE 2-4 GM/100ML-% SOLUTION: Performed by: ANESTHESIOLOGY

## 2018-12-27 PROCEDURE — 76000 FLUOROSCOPY <1 HR PHYS/QHP: CPT

## 2018-12-27 PROCEDURE — 97165 OT EVAL LOW COMPLEX 30 MIN: CPT | Performed by: OCCUPATIONAL THERAPIST

## 2018-12-27 PROCEDURE — 25010000002 ONDANSETRON PER 1 MG: Performed by: ANESTHESIOLOGY

## 2018-12-27 PROCEDURE — 99223 1ST HOSP IP/OBS HIGH 75: CPT | Performed by: ORTHOPAEDIC SURGERY

## 2018-12-27 PROCEDURE — 73070 X-RAY EXAM OF ELBOW: CPT

## 2018-12-27 PROCEDURE — 24685 OPTX ULNAR FX PROX END W/FIX: CPT | Performed by: ORTHOPAEDIC SURGERY

## 2018-12-27 RX ORDER — FAMOTIDINE 10 MG/ML
INJECTION, SOLUTION INTRAVENOUS
Status: COMPLETED
Start: 2018-12-27 | End: 2018-12-27

## 2018-12-27 RX ORDER — LEVOTHYROXINE SODIUM 0.07 MG/1
75 TABLET ORAL DAILY
Status: DISCONTINUED | OUTPATIENT
Start: 2018-12-27 | End: 2018-12-28

## 2018-12-27 RX ORDER — MIDAZOLAM HYDROCHLORIDE 1 MG/ML
2 INJECTION INTRAMUSCULAR; INTRAVENOUS
Status: DISCONTINUED | OUTPATIENT
Start: 2018-12-27 | End: 2018-12-27 | Stop reason: HOSPADM

## 2018-12-27 RX ORDER — PROMETHAZINE HYDROCHLORIDE 25 MG/ML
12.5 INJECTION, SOLUTION INTRAMUSCULAR; INTRAVENOUS ONCE AS NEEDED
Status: DISCONTINUED | OUTPATIENT
Start: 2018-12-27 | End: 2018-12-27 | Stop reason: HOSPADM

## 2018-12-27 RX ORDER — NALOXONE HCL 0.4 MG/ML
0.2 VIAL (ML) INJECTION AS NEEDED
Status: DISCONTINUED | OUTPATIENT
Start: 2018-12-27 | End: 2018-12-27 | Stop reason: HOSPADM

## 2018-12-27 RX ORDER — MORPHINE SULFATE 2 MG/ML
2 INJECTION, SOLUTION INTRAMUSCULAR; INTRAVENOUS EVERY 4 HOURS PRN
Status: DISCONTINUED | OUTPATIENT
Start: 2018-12-27 | End: 2018-12-29 | Stop reason: HOSPADM

## 2018-12-27 RX ORDER — SODIUM CHLORIDE 0.9 % (FLUSH) 0.9 %
1-10 SYRINGE (ML) INJECTION AS NEEDED
Status: DISCONTINUED | OUTPATIENT
Start: 2018-12-27 | End: 2018-12-27 | Stop reason: HOSPADM

## 2018-12-27 RX ORDER — HYDROCODONE BITARTRATE AND ACETAMINOPHEN 5; 325 MG/1; MG/1
1 TABLET ORAL EVERY 4 HOURS PRN
Status: DISCONTINUED | OUTPATIENT
Start: 2018-12-27 | End: 2018-12-28

## 2018-12-27 RX ORDER — TAMSULOSIN HYDROCHLORIDE 0.4 MG/1
0.4 CAPSULE ORAL NIGHTLY
Status: DISCONTINUED | OUTPATIENT
Start: 2018-12-27 | End: 2018-12-29 | Stop reason: HOSPADM

## 2018-12-27 RX ORDER — OXYCODONE AND ACETAMINOPHEN 7.5; 325 MG/1; MG/1
1 TABLET ORAL ONCE AS NEEDED
Status: DISCONTINUED | OUTPATIENT
Start: 2018-12-27 | End: 2018-12-27 | Stop reason: HOSPADM

## 2018-12-27 RX ORDER — PROMETHAZINE HYDROCHLORIDE 25 MG/1
25 TABLET ORAL ONCE AS NEEDED
Status: DISCONTINUED | OUTPATIENT
Start: 2018-12-27 | End: 2018-12-27 | Stop reason: HOSPADM

## 2018-12-27 RX ORDER — EPHEDRINE SULFATE 50 MG/ML
5 INJECTION, SOLUTION INTRAVENOUS ONCE AS NEEDED
Status: DISCONTINUED | OUTPATIENT
Start: 2018-12-27 | End: 2018-12-27 | Stop reason: HOSPADM

## 2018-12-27 RX ORDER — CEFAZOLIN SODIUM 2 G/100ML
2 INJECTION, SOLUTION INTRAVENOUS EVERY 8 HOURS
Status: DISCONTINUED | OUTPATIENT
Start: 2018-12-27 | End: 2018-12-29 | Stop reason: HOSPADM

## 2018-12-27 RX ORDER — PROPOFOL 10 MG/ML
VIAL (ML) INTRAVENOUS AS NEEDED
Status: DISCONTINUED | OUTPATIENT
Start: 2018-12-27 | End: 2018-12-27 | Stop reason: SURG

## 2018-12-27 RX ORDER — CALCIUM CARBONATE 200(500)MG
2 TABLET,CHEWABLE ORAL 4 TIMES DAILY
Status: DISCONTINUED | OUTPATIENT
Start: 2018-12-27 | End: 2018-12-28

## 2018-12-27 RX ORDER — SODIUM CHLORIDE, SODIUM LACTATE, POTASSIUM CHLORIDE, CALCIUM CHLORIDE 600; 310; 30; 20 MG/100ML; MG/100ML; MG/100ML; MG/100ML
9 INJECTION, SOLUTION INTRAVENOUS CONTINUOUS
Status: DISCONTINUED | OUTPATIENT
Start: 2018-12-27 | End: 2018-12-29 | Stop reason: HOSPADM

## 2018-12-27 RX ORDER — IPRATROPIUM BROMIDE AND ALBUTEROL SULFATE 2.5; .5 MG/3ML; MG/3ML
3 SOLUTION RESPIRATORY (INHALATION)
Status: DISCONTINUED | OUTPATIENT
Start: 2018-12-27 | End: 2018-12-29 | Stop reason: HOSPADM

## 2018-12-27 RX ORDER — LABETALOL HYDROCHLORIDE 5 MG/ML
5 INJECTION, SOLUTION INTRAVENOUS
Status: DISCONTINUED | OUTPATIENT
Start: 2018-12-27 | End: 2018-12-27 | Stop reason: HOSPADM

## 2018-12-27 RX ORDER — FENTANYL CITRATE 50 UG/ML
50 INJECTION, SOLUTION INTRAMUSCULAR; INTRAVENOUS
Status: DISCONTINUED | OUTPATIENT
Start: 2018-12-27 | End: 2018-12-27 | Stop reason: HOSPADM

## 2018-12-27 RX ORDER — ACETAMINOPHEN 325 MG/1
650 TABLET ORAL EVERY 6 HOURS PRN
Status: DISCONTINUED | OUTPATIENT
Start: 2018-12-27 | End: 2018-12-29 | Stop reason: HOSPADM

## 2018-12-27 RX ORDER — ONDANSETRON 2 MG/ML
4 INJECTION INTRAMUSCULAR; INTRAVENOUS ONCE AS NEEDED
Status: DISCONTINUED | OUTPATIENT
Start: 2018-12-27 | End: 2018-12-27 | Stop reason: HOSPADM

## 2018-12-27 RX ORDER — LISINOPRIL 20 MG/1
10 TABLET ORAL DAILY
Status: DISCONTINUED | OUTPATIENT
Start: 2018-12-27 | End: 2018-12-27

## 2018-12-27 RX ORDER — MIDAZOLAM HYDROCHLORIDE 1 MG/ML
INJECTION INTRAMUSCULAR; INTRAVENOUS
Status: COMPLETED
Start: 2018-12-27 | End: 2018-12-27

## 2018-12-27 RX ORDER — ACETAMINOPHEN 325 MG/1
650 TABLET ORAL ONCE AS NEEDED
Status: DISCONTINUED | OUTPATIENT
Start: 2018-12-27 | End: 2018-12-27 | Stop reason: HOSPADM

## 2018-12-27 RX ORDER — ONDANSETRON 2 MG/ML
INJECTION INTRAMUSCULAR; INTRAVENOUS AS NEEDED
Status: DISCONTINUED | OUTPATIENT
Start: 2018-12-27 | End: 2018-12-27 | Stop reason: SURG

## 2018-12-27 RX ORDER — ONDANSETRON 2 MG/ML
4 INJECTION INTRAMUSCULAR; INTRAVENOUS EVERY 6 HOURS PRN
Status: DISCONTINUED | OUTPATIENT
Start: 2018-12-27 | End: 2018-12-29 | Stop reason: HOSPADM

## 2018-12-27 RX ORDER — LISINOPRIL 10 MG/1
10 TABLET ORAL NIGHTLY
Status: DISCONTINUED | OUTPATIENT
Start: 2018-12-28 | End: 2018-12-29 | Stop reason: HOSPADM

## 2018-12-27 RX ORDER — DIPHENHYDRAMINE HCL 25 MG
25 CAPSULE ORAL
Status: DISCONTINUED | OUTPATIENT
Start: 2018-12-27 | End: 2018-12-27 | Stop reason: HOSPADM

## 2018-12-27 RX ORDER — MIDAZOLAM HYDROCHLORIDE 1 MG/ML
1 INJECTION INTRAMUSCULAR; INTRAVENOUS
Status: DISCONTINUED | OUTPATIENT
Start: 2018-12-27 | End: 2018-12-27 | Stop reason: HOSPADM

## 2018-12-27 RX ORDER — BUPIVACAINE HYDROCHLORIDE 5 MG/ML
INJECTION, SOLUTION EPIDURAL; INTRACAUDAL AS NEEDED
Status: DISCONTINUED | OUTPATIENT
Start: 2018-12-27 | End: 2018-12-27 | Stop reason: HOSPADM

## 2018-12-27 RX ORDER — PANTOPRAZOLE SODIUM 40 MG/1
40 TABLET, DELAYED RELEASE ORAL EVERY MORNING
Status: DISCONTINUED | OUTPATIENT
Start: 2018-12-27 | End: 2018-12-29 | Stop reason: HOSPADM

## 2018-12-27 RX ORDER — ONDANSETRON 4 MG/1
4 TABLET, FILM COATED ORAL EVERY 6 HOURS PRN
Status: DISCONTINUED | OUTPATIENT
Start: 2018-12-27 | End: 2018-12-29 | Stop reason: HOSPADM

## 2018-12-27 RX ORDER — LIDOCAINE HYDROCHLORIDE 10 MG/ML
0.5 INJECTION, SOLUTION EPIDURAL; INFILTRATION; INTRACAUDAL; PERINEURAL ONCE AS NEEDED
Status: DISCONTINUED | OUTPATIENT
Start: 2018-12-27 | End: 2018-12-27 | Stop reason: HOSPADM

## 2018-12-27 RX ORDER — CEFAZOLIN SODIUM 2 G/100ML
INJECTION, SOLUTION INTRAVENOUS AS NEEDED
Status: DISCONTINUED | OUTPATIENT
Start: 2018-12-27 | End: 2018-12-27 | Stop reason: SURG

## 2018-12-27 RX ORDER — HYDRALAZINE HYDROCHLORIDE 20 MG/ML
5 INJECTION INTRAMUSCULAR; INTRAVENOUS
Status: DISCONTINUED | OUTPATIENT
Start: 2018-12-27 | End: 2018-12-27 | Stop reason: HOSPADM

## 2018-12-27 RX ORDER — DIPHENHYDRAMINE HYDROCHLORIDE 50 MG/ML
12.5 INJECTION INTRAMUSCULAR; INTRAVENOUS
Status: DISCONTINUED | OUTPATIENT
Start: 2018-12-27 | End: 2018-12-27 | Stop reason: HOSPADM

## 2018-12-27 RX ORDER — PROMETHAZINE HYDROCHLORIDE 25 MG/1
25 SUPPOSITORY RECTAL ONCE AS NEEDED
Status: DISCONTINUED | OUTPATIENT
Start: 2018-12-27 | End: 2018-12-27 | Stop reason: HOSPADM

## 2018-12-27 RX ORDER — HYDROMORPHONE HYDROCHLORIDE 1 MG/ML
0.5 INJECTION, SOLUTION INTRAMUSCULAR; INTRAVENOUS; SUBCUTANEOUS
Status: DISCONTINUED | OUTPATIENT
Start: 2018-12-27 | End: 2018-12-27 | Stop reason: HOSPADM

## 2018-12-27 RX ORDER — FAMOTIDINE 10 MG/ML
20 INJECTION, SOLUTION INTRAVENOUS ONCE
Status: DISCONTINUED | OUTPATIENT
Start: 2018-12-27 | End: 2018-12-27 | Stop reason: HOSPADM

## 2018-12-27 RX ORDER — HYDROCODONE BITARTRATE AND ACETAMINOPHEN 7.5; 325 MG/1; MG/1
1 TABLET ORAL ONCE AS NEEDED
Status: DISCONTINUED | OUTPATIENT
Start: 2018-12-27 | End: 2018-12-27 | Stop reason: HOSPADM

## 2018-12-27 RX ORDER — FENTANYL CITRATE 50 UG/ML
INJECTION, SOLUTION INTRAMUSCULAR; INTRAVENOUS
Status: COMPLETED
Start: 2018-12-27 | End: 2018-12-27

## 2018-12-27 RX ORDER — FLUMAZENIL 0.1 MG/ML
0.2 INJECTION INTRAVENOUS AS NEEDED
Status: DISCONTINUED | OUTPATIENT
Start: 2018-12-27 | End: 2018-12-27 | Stop reason: HOSPADM

## 2018-12-27 RX ORDER — LIDOCAINE HYDROCHLORIDE 20 MG/ML
INJECTION, SOLUTION INFILTRATION; PERINEURAL AS NEEDED
Status: DISCONTINUED | OUTPATIENT
Start: 2018-12-27 | End: 2018-12-27 | Stop reason: SURG

## 2018-12-27 RX ORDER — ONDANSETRON 4 MG/1
4 TABLET, ORALLY DISINTEGRATING ORAL EVERY 6 HOURS PRN
Status: DISCONTINUED | OUTPATIENT
Start: 2018-12-27 | End: 2018-12-29 | Stop reason: HOSPADM

## 2018-12-27 RX ADMIN — HYDROCODONE BITARTRATE AND ACETAMINOPHEN 1 TABLET: 5; 325 TABLET ORAL at 17:16

## 2018-12-27 RX ADMIN — ONDANSETRON 4 MG: 2 INJECTION INTRAMUSCULAR; INTRAVENOUS at 03:08

## 2018-12-27 RX ADMIN — Medication 2 TABLET: at 23:13

## 2018-12-27 RX ADMIN — TETANUS TOXOID, REDUCED DIPHTHERIA TOXOID AND ACELLULAR PERTUSSIS VACCINE, ADSORBED 0.5 ML: 5; 2.5; 8; 8; 2.5 SUSPENSION INTRAMUSCULAR at 01:12

## 2018-12-27 RX ADMIN — PROPOFOL 130 MG: 10 INJECTION, EMULSION INTRAVENOUS at 01:44

## 2018-12-27 RX ADMIN — HYDROCODONE BITARTRATE AND ACETAMINOPHEN 1 TABLET: 5; 325 TABLET ORAL at 11:39

## 2018-12-27 RX ADMIN — FAMOTIDINE 20 MG: 10 INJECTION, SOLUTION INTRAVENOUS at 01:29

## 2018-12-27 RX ADMIN — SODIUM CHLORIDE, POTASSIUM CHLORIDE, SODIUM LACTATE AND CALCIUM CHLORIDE: 600; 310; 30; 20 INJECTION, SOLUTION INTRAVENOUS at 01:38

## 2018-12-27 RX ADMIN — LEVOTHYROXINE SODIUM 75 MCG: 75 TABLET ORAL at 08:05

## 2018-12-27 RX ADMIN — FENTANYL CITRATE 50 MCG: 50 INJECTION INTRAMUSCULAR; INTRAVENOUS at 02:19

## 2018-12-27 RX ADMIN — IPRATROPIUM BROMIDE AND ALBUTEROL SULFATE 3 ML: 2.5; .5 SOLUTION RESPIRATORY (INHALATION) at 09:05

## 2018-12-27 RX ADMIN — IPRATROPIUM BROMIDE AND ALBUTEROL SULFATE 3 ML: 2.5; .5 SOLUTION RESPIRATORY (INHALATION) at 16:38

## 2018-12-27 RX ADMIN — FENTANYL CITRATE 50 MCG: 50 INJECTION INTRAMUSCULAR; INTRAVENOUS at 01:43

## 2018-12-27 RX ADMIN — TAMSULOSIN HYDROCHLORIDE 0.4 MG: 0.4 CAPSULE ORAL at 23:12

## 2018-12-27 RX ADMIN — MIDAZOLAM HYDROCHLORIDE 1 MG: 2 INJECTION, SOLUTION INTRAMUSCULAR; INTRAVENOUS at 01:31

## 2018-12-27 RX ADMIN — LISINOPRIL 10 MG: 20 TABLET ORAL at 08:07

## 2018-12-27 RX ADMIN — CEFAZOLIN SODIUM 2 G: 2 INJECTION, SOLUTION INTRAVENOUS at 10:53

## 2018-12-27 RX ADMIN — FENTANYL CITRATE 50 MCG: 50 INJECTION, SOLUTION INTRAMUSCULAR; INTRAVENOUS at 03:40

## 2018-12-27 RX ADMIN — HYDROCODONE BITARTRATE AND ACETAMINOPHEN 1 TABLET: 5; 325 TABLET ORAL at 05:24

## 2018-12-27 RX ADMIN — HYDROCODONE BITARTRATE AND ACETAMINOPHEN 1 TABLET: 5; 325 TABLET ORAL at 23:13

## 2018-12-27 RX ADMIN — CEFAZOLIN SODIUM 2 G: 2 INJECTION, SOLUTION INTRAVENOUS at 18:35

## 2018-12-27 RX ADMIN — LIDOCAINE HYDROCHLORIDE 50 MG: 20 INJECTION, SOLUTION INFILTRATION; PERINEURAL at 01:44

## 2018-12-27 RX ADMIN — Medication 2 TABLET: at 12:01

## 2018-12-27 RX ADMIN — CEFAZOLIN SODIUM 2 G: 2 INJECTION, SOLUTION INTRAVENOUS at 02:10

## 2018-12-27 NOTE — ANESTHESIA PREPROCEDURE EVALUATION
Anesthesia Evaluation     Patient summary reviewed and Nursing notes reviewed   NPO Solid Status: > 8 hours  NPO Liquid Status: > 6 hours           Airway   Mallampati: II  Dental          Pulmonary - normal exam   (+) COPD,   Cardiovascular     ECG reviewed  Rhythm: regular  Rate: normal    (+) hypertension, murmur,       Neuro/Psych  GI/Hepatic/Renal/Endo      Musculoskeletal     Abdominal    Substance History      OB/GYN          Other   (+) arthritis   history of cancer remission                  Anesthesia Plan    ASA 3 - emergent     general     intravenous induction   Anesthetic plan, all risks, benefits, and alternatives have been provided, discussed and informed consent has been obtained with: patient.

## 2018-12-27 NOTE — ANESTHESIA PROCEDURE NOTES
Airway  Urgency: elective      General Information and Staff    Patient location during procedure: OR  Anesthesiologist: Luis Lin MD    Indications and Patient Condition  Indications for airway management: airway protection    Preoxygenated: yes  Mask difficulty assessment: 1 - vent by mask    Final Airway Details  Final airway type: supraglottic airway      Successful airway: classic  Size 5    Number of attempts at approach: 1    Additional Comments  LMA inserted with ease

## 2018-12-27 NOTE — ANESTHESIA POSTPROCEDURE EVALUATION
Patient: Jase Cardoza    Procedure Summary     Date:  12/27/18 Room / Location:  [facility] OR  / [facility] MAIN OR    Anesthesia Start:  0138 Anesthesia Stop:  0316    Procedure:  INCISION AND DRAINAGE AND ORIF OF RIGHT ELECRONON (Right Elbow) Diagnosis:      Surgeon:  Sung Fry MD Provider:  Briana Doll MD    Anesthesia Type:  general ASA Status:  3 - Emergent          Anesthesia Type: general  Last vitals  BP   156/77 (12/27/18 0043)   Temp   37.7 °C (99.8 °F) (12/27/18 0043)   Pulse   88 (12/27/18 0043)   Resp   16 (12/27/18 0043)     SpO2   99 % (12/27/18 0043)     Anesthesia Post Evaluation

## 2018-12-28 LAB
ANION GAP SERPL CALCULATED.3IONS-SCNC: 10 MMOL/L
BASOPHILS # BLD AUTO: 0.01 10*3/MM3 (ref 0–0.2)
BASOPHILS NFR BLD AUTO: 0.2 % (ref 0–1.5)
BUN BLD-MCNC: 18 MG/DL (ref 8–23)
BUN/CREAT SERPL: 16.1 (ref 7–25)
CALCIUM SPEC-SCNC: 8.7 MG/DL (ref 8.2–9.6)
CHLORIDE SERPL-SCNC: 104 MMOL/L (ref 98–107)
CO2 SERPL-SCNC: 26 MMOL/L (ref 22–29)
CREAT BLD-MCNC: 1.12 MG/DL (ref 0.76–1.27)
DEPRECATED RDW RBC AUTO: 50.3 FL (ref 37–54)
EOSINOPHIL # BLD AUTO: 0.21 10*3/MM3 (ref 0–0.7)
EOSINOPHIL NFR BLD AUTO: 3.6 % (ref 0.3–6.2)
ERYTHROCYTE [DISTWIDTH] IN BLOOD BY AUTOMATED COUNT: 15.1 % (ref 11.5–14.5)
GFR SERPL CREATININE-BSD FRML MDRD: 61 ML/MIN/1.73
GLUCOSE BLD-MCNC: 95 MG/DL (ref 65–99)
HCT VFR BLD AUTO: 32.3 % (ref 40.4–52.2)
HGB BLD-MCNC: 10.1 G/DL (ref 13.7–17.6)
IMM GRANULOCYTES # BLD AUTO: 0.03 10*3/MM3 (ref 0–0.03)
IMM GRANULOCYTES NFR BLD AUTO: 0.5 % (ref 0–0.5)
LYMPHOCYTES # BLD AUTO: 1.1 10*3/MM3 (ref 0.9–4.8)
LYMPHOCYTES NFR BLD AUTO: 18.9 % (ref 19.6–45.3)
MCH RBC QN AUTO: 28.3 PG (ref 27–32.7)
MCHC RBC AUTO-ENTMCNC: 31.3 G/DL (ref 32.6–36.4)
MCV RBC AUTO: 90.5 FL (ref 79.8–96.2)
MONOCYTES # BLD AUTO: 0.87 10*3/MM3 (ref 0.2–1.2)
MONOCYTES NFR BLD AUTO: 14.9 % (ref 5–12)
NEUTROPHILS # BLD AUTO: 3.64 10*3/MM3 (ref 1.9–8.1)
NEUTROPHILS NFR BLD AUTO: 62.4 % (ref 42.7–76)
PLATELET # BLD AUTO: 187 10*3/MM3 (ref 140–500)
PMV BLD AUTO: 10 FL (ref 6–12)
POTASSIUM BLD-SCNC: 3.9 MMOL/L (ref 3.5–5.2)
RBC # BLD AUTO: 3.57 10*6/MM3 (ref 4.6–6)
SODIUM BLD-SCNC: 140 MMOL/L (ref 136–145)
WBC NRBC COR # BLD: 5.83 10*3/MM3 (ref 4.5–10.7)

## 2018-12-28 PROCEDURE — 25010000003 CEFAZOLIN IN DEXTROSE 2-4 GM/100ML-% SOLUTION: Performed by: ORTHOPAEDIC SURGERY

## 2018-12-28 PROCEDURE — 94799 UNLISTED PULMONARY SVC/PX: CPT

## 2018-12-28 PROCEDURE — 80048 BASIC METABOLIC PNL TOTAL CA: CPT | Performed by: INTERNAL MEDICINE

## 2018-12-28 PROCEDURE — 85025 COMPLETE CBC W/AUTO DIFF WBC: CPT | Performed by: INTERNAL MEDICINE

## 2018-12-28 PROCEDURE — 97110 THERAPEUTIC EXERCISES: CPT

## 2018-12-28 PROCEDURE — 99024 POSTOP FOLLOW-UP VISIT: CPT | Performed by: NURSE PRACTITIONER

## 2018-12-28 RX ORDER — ACETAMINOPHEN 325 MG/1
650 TABLET ORAL 3 TIMES DAILY
Status: DISCONTINUED | OUTPATIENT
Start: 2018-12-28 | End: 2018-12-29 | Stop reason: HOSPADM

## 2018-12-28 RX ORDER — LEVOTHYROXINE SODIUM 137 UG/1
137 TABLET ORAL
Status: DISCONTINUED | OUTPATIENT
Start: 2018-12-29 | End: 2018-12-29 | Stop reason: HOSPADM

## 2018-12-28 RX ORDER — LEVOTHYROXINE SODIUM 0.05 MG/1
50 TABLET ORAL ONCE
Status: COMPLETED | OUTPATIENT
Start: 2018-12-28 | End: 2018-12-28

## 2018-12-28 RX ORDER — LEVOTHYROXINE SODIUM 0.07 MG/1
75 TABLET ORAL
Status: DISCONTINUED | OUTPATIENT
Start: 2018-12-29 | End: 2018-12-28

## 2018-12-28 RX ORDER — HYDROCODONE BITARTRATE AND ACETAMINOPHEN 7.5; 325 MG/1; MG/1
1 TABLET ORAL EVERY 4 HOURS PRN
Status: DISCONTINUED | OUTPATIENT
Start: 2018-12-28 | End: 2018-12-29 | Stop reason: HOSPADM

## 2018-12-28 RX ADMIN — CEFAZOLIN SODIUM 2 G: 2 INJECTION, SOLUTION INTRAVENOUS at 10:17

## 2018-12-28 RX ADMIN — HYDROCODONE BITARTRATE AND ACETAMINOPHEN 1 TABLET: 5; 325 TABLET ORAL at 14:58

## 2018-12-28 RX ADMIN — LISINOPRIL 10 MG: 10 TABLET ORAL at 20:59

## 2018-12-28 RX ADMIN — CEFAZOLIN SODIUM 2 G: 2 INJECTION, SOLUTION INTRAVENOUS at 03:24

## 2018-12-28 RX ADMIN — IPRATROPIUM BROMIDE AND ALBUTEROL SULFATE 3 ML: 2.5; .5 SOLUTION RESPIRATORY (INHALATION) at 08:37

## 2018-12-28 RX ADMIN — LEVOTHYROXINE SODIUM 75 MCG: 75 TABLET ORAL at 08:33

## 2018-12-28 RX ADMIN — PANTOPRAZOLE SODIUM 40 MG: 40 TABLET, DELAYED RELEASE ORAL at 08:33

## 2018-12-28 RX ADMIN — CEFAZOLIN SODIUM 2 G: 2 INJECTION, SOLUTION INTRAVENOUS at 18:20

## 2018-12-28 RX ADMIN — HYDROCODONE BITARTRATE AND ACETAMINOPHEN 1 TABLET: 7.5; 325 TABLET ORAL at 23:05

## 2018-12-28 RX ADMIN — IPRATROPIUM BROMIDE AND ALBUTEROL SULFATE 3 ML: 2.5; .5 SOLUTION RESPIRATORY (INHALATION) at 20:33

## 2018-12-28 RX ADMIN — IPRATROPIUM BROMIDE AND ALBUTEROL SULFATE 3 ML: 2.5; .5 SOLUTION RESPIRATORY (INHALATION) at 11:20

## 2018-12-28 RX ADMIN — LEVOTHYROXINE SODIUM 50 MCG: 50 TABLET ORAL at 17:25

## 2018-12-28 RX ADMIN — TAMSULOSIN HYDROCHLORIDE 0.4 MG: 0.4 CAPSULE ORAL at 21:00

## 2018-12-28 RX ADMIN — HYDROCODONE BITARTRATE AND ACETAMINOPHEN 1 TABLET: 7.5; 325 TABLET ORAL at 19:00

## 2018-12-28 RX ADMIN — HYDROCODONE BITARTRATE AND ACETAMINOPHEN 1 TABLET: 5; 325 TABLET ORAL at 10:18

## 2018-12-29 VITALS
RESPIRATION RATE: 14 BRPM | DIASTOLIC BLOOD PRESSURE: 69 MMHG | HEART RATE: 90 BPM | TEMPERATURE: 98.2 F | WEIGHT: 158.6 LBS | OXYGEN SATURATION: 90 % | SYSTOLIC BLOOD PRESSURE: 134 MMHG | HEIGHT: 71 IN | BODY MASS INDEX: 22.2 KG/M2

## 2018-12-29 PROCEDURE — 94799 UNLISTED PULMONARY SVC/PX: CPT

## 2018-12-29 PROCEDURE — 25010000003 CEFAZOLIN IN DEXTROSE 2-4 GM/100ML-% SOLUTION: Performed by: ORTHOPAEDIC SURGERY

## 2018-12-29 RX ORDER — ACETAMINOPHEN 325 MG/1
650 TABLET ORAL 3 TIMES DAILY
Start: 2018-12-29 | End: 2019-01-05

## 2018-12-29 RX ORDER — HYDROCODONE BITARTRATE AND ACETAMINOPHEN 7.5; 325 MG/1; MG/1
1 TABLET ORAL EVERY 4 HOURS PRN
Qty: 25 TABLET | Refills: 0 | Status: SHIPPED | OUTPATIENT
Start: 2018-12-29 | End: 2021-04-23

## 2018-12-29 RX ORDER — POLYETHYLENE GLYCOL 3350 17 G/17G
17 POWDER, FOR SOLUTION ORAL DAILY
Start: 2018-12-29 | End: 2021-04-23

## 2018-12-29 RX ORDER — ONDANSETRON 4 MG/1
4 TABLET, FILM COATED ORAL EVERY 6 HOURS PRN
Qty: 10 TABLET | Refills: 0 | Status: SHIPPED | OUTPATIENT
Start: 2018-12-29 | End: 2021-04-23

## 2018-12-29 RX ADMIN — PANTOPRAZOLE SODIUM 40 MG: 40 TABLET, DELAYED RELEASE ORAL at 06:08

## 2018-12-29 RX ADMIN — LEVOTHYROXINE SODIUM 137 MCG: 137 TABLET ORAL at 06:08

## 2018-12-29 RX ADMIN — IPRATROPIUM BROMIDE AND ALBUTEROL SULFATE 3 ML: 2.5; .5 SOLUTION RESPIRATORY (INHALATION) at 07:33

## 2018-12-29 RX ADMIN — ACETAMINOPHEN 650 MG: 325 TABLET, FILM COATED ORAL at 09:12

## 2018-12-29 RX ADMIN — CEFAZOLIN SODIUM 2 G: 2 INJECTION, SOLUTION INTRAVENOUS at 02:11

## 2018-12-30 ENCOUNTER — READMISSION MANAGEMENT (OUTPATIENT)
Dept: CALL CENTER | Facility: HOSPITAL | Age: 83
End: 2018-12-30

## 2018-12-30 NOTE — OUTREACH NOTE
Prep Survey      Responses   Facility patient discharged from?  [facility]   Is patient eligible?  Yes   Discharge diagnosis  Open fracture of olecranon process of right ulna    Does the patient have one of the following disease processes/diagnoses(primary or secondary)?  General Surgery   Does the patient have Home health ordered?  No   Is there a DME ordered?  No   Prep survey completed?  Yes          Angle Sanchez RN

## 2019-01-03 ENCOUNTER — READMISSION MANAGEMENT (OUTPATIENT)
Dept: CALL CENTER | Facility: HOSPITAL | Age: 84
End: 2019-01-03

## 2019-01-03 NOTE — OUTREACH NOTE
General Surgery Week 1 Survey      Responses   Facility patient discharged from?  [facility]   Does the patient have one of the following disease processes/diagnoses(primary or secondary)?  General Surgery   Is there a successful TCM telephone encounter documented?  No   Week 1 attempt successful?  No   Unsuccessful attempts  Attempt 1          Re Márquez RN

## 2019-01-08 ENCOUNTER — READMISSION MANAGEMENT (OUTPATIENT)
Dept: CALL CENTER | Facility: HOSPITAL | Age: 84
End: 2019-01-08

## 2019-01-08 NOTE — OUTREACH NOTE
General Surgery Week 1 Survey      Responses   Facility patient discharged from?  [facility]   Does the patient have one of the following disease processes/diagnoses(primary or secondary)?  General Surgery   Is there a successful TCM telephone encounter documented?  No   Week 1 attempt successful?  No   Unsuccessful attempts  Attempt 2          Gregoria Carter RN

## 2019-01-09 ENCOUNTER — READMISSION MANAGEMENT (OUTPATIENT)
Dept: CALL CENTER | Facility: HOSPITAL | Age: 84
End: 2019-01-09

## 2019-01-09 NOTE — OUTREACH NOTE
General Surgery Week 2 Survey      Responses   Facility patient discharged from?  [facility]   Does the patient have one of the following disease processes/diagnoses(primary or secondary)?  General Surgery   Week 2 attempt successful?  Yes   Call start time  1452   Call end time  1500   Discharge diagnosis  Open fracture of olecranon process of right ulna    Is patient permission given to speak with other caregiver?  Yes   List who call center can speak with  lives alone   Meds reviewed with patient/caregiver?  Yes   Is the patient having any side effects they believe may be caused by any medication additions or changes?  No   Does the patient have all medications related to this admission filled (includes all antibiotics, pain medications, etc.)  Yes   Is the patient taking all medications as directed (includes completed medication regime)?  Yes   Does the patient have a follow up appointment scheduled with their surgeon?  Yes   Has the patient kept scheduled appointments due by today?  N/A   Comments  01/15 with surgeon     Has home health visited the patient within 72 hours of discharge?  N/A   Psychosocial issues?  No   Did the patient receive a copy of their discharge instructions?  Yes   Nursing interventions  Reviewed instructions with patient, Educated on MyChart   What is the patient's perception of their health status since discharge?  Improving   Nursing interventions  Nurse provided patient education   Is the patient /caregiver able to teach back basic post-op care?  Drive as instructed by MD in discharge instructions, Practice 'cough and deep breath', No tub bath, swimming, or hot tub until instructed by MD, Lifting as instructed by MD in discharge instructions, Take showers only when approved by MD-sponge bathe until then, Keep incision areas clean,dry and protected   Is the patient/caregiver able to teach back signs and symptoms of incisional infection?  -- [Stationary brace wrapped with a cloth type  material.  Moving finger freely.]   Is the patient/caregiver able to teach back steps to recovery at home?  Rest and rebuild strength, gradually increase activity, Make a list of questions for surgeon's appointment   Is the patient/caregiver able to teach back the hierarchy of who to call/visit for symptoms/problems? PCP, Specialist, Home health nurse, Urgent Care, ED, 911  Yes   Week 2 call completed?  Yes          Gregoria Carter RN

## 2019-01-13 ENCOUNTER — NURSE TRIAGE (OUTPATIENT)
Dept: CALL CENTER | Facility: HOSPITAL | Age: 84
End: 2019-01-13

## 2019-01-13 NOTE — TELEPHONE ENCOUNTER
"has fx of olecranon process of right ulna, while taking a shower tonight, the bandage is wet and ace is wet, will be calling the surgeon.  Will be seeing the surgeon on Wednesday. Given the number for the ortho provider    Reason for Disposition  • [1] Caller requesting NON-URGENT health information AND [2] PCP's office is the best resource    Additional Information  • Negative: [1] Caller is not with the adult (patient) AND [2] reporting urgent symptoms  • Negative: Lab result questions  • Negative: Medication questions  • Negative: Caller cannot be reached by phone  • Negative: Caller has already spoken to PCP or another triager  • Negative: RN needs further essential information from caller in order to complete triage  • Negative: Requesting regular office appointment    Answer Assessment - Initial Assessment Questions  1. REASON FOR CALL or QUESTION: \"What is your reason for calling today?\" or \"How can I best help you?\" or \"What question do you have that I can help answer?\"      Bandage is wet    Protocols used: INFORMATION ONLY CALL-ADULT-      "

## 2019-01-14 ENCOUNTER — OFFICE VISIT (OUTPATIENT)
Dept: ORTHOPEDIC SURGERY | Facility: CLINIC | Age: 84
End: 2019-01-14

## 2019-01-14 ENCOUNTER — TELEPHONE (OUTPATIENT)
Dept: ORTHOPEDIC SURGERY | Facility: CLINIC | Age: 84
End: 2019-01-14

## 2019-01-14 VITALS — WEIGHT: 150 LBS | BODY MASS INDEX: 21 KG/M2 | TEMPERATURE: 97.9 F | HEIGHT: 71 IN

## 2019-01-14 DIAGNOSIS — S59.901A INJURY OF RIGHT ELBOW, INITIAL ENCOUNTER: Primary | ICD-10-CM

## 2019-01-14 DIAGNOSIS — S52.021E: ICD-10-CM

## 2019-01-14 PROCEDURE — 73070 X-RAY EXAM OF ELBOW: CPT | Performed by: ORTHOPAEDIC SURGERY

## 2019-01-14 PROCEDURE — 99024 POSTOP FOLLOW-UP VISIT: CPT | Performed by: ORTHOPAEDIC SURGERY

## 2019-01-14 NOTE — PATIENT INSTRUCTIONS
May remove brace to take shower but hold arm in position do not straighten while out of brace.  Otherwise keep in brace at all times.

## 2019-01-14 NOTE — PROGRESS NOTES
Patient Name: Jase Cardoza   YOB: 1927  Referring Primary Care Physician: Oxana Wise MD  BMI: Body mass index is 20.92 kg/m².    Chief Complaint:    Chief Complaint   Patient presents with   • Right Elbow - Follow-up, Pain        Subjective:    HPI:   Jase Cardoza is a pleasant 89 y.o. year old who presents today for evaluation of   Chief Complaint   Patient presents with   • Right Elbow - Follow-up, Pain    fu on open fracture.  Doing well.  Sp;int got wet in the shower.  Has large abrasion and open area with orif.  This problem is not new to this examiner.     Medications:   Home Medications:  Current Outpatient Medications on File Prior to Visit   Medication Sig   • calcium carbonate (TUMS) 500 MG chewable tablet Chew 1,000 mg 4 (Four) Times a Day.   • fenofibrate (TRICOR) 145 MG tablet Take 160 mg by mouth Daily.   • HYDROcodone-acetaminophen (NORCO) 7.5-325 MG per tablet Take 1 tablet by mouth Every 4 (Four) Hours As Needed for Moderate Pain .   • levothyroxine (SYNTHROID, LEVOTHROID) 75 MCG tablet Take 1 tablet by mouth Daily. (Patient taking differently: Take 137 mcg by mouth Daily.)   • lisinopril (PRINIVIL,ZESTRIL) 10 MG tablet Take 10 mg by mouth Daily.   • omeprazole (priLOSEC) 20 MG capsule Take 20 mg by mouth Daily.   • ondansetron (ZOFRAN) 4 MG tablet Take 1 tablet by mouth Every 6 (Six) Hours As Needed for Nausea or Vomiting.   • polyethylene glycol (MIRALAX) packet Take 17 g by mouth Daily.   • tamsulosin (FLOMAX) 0.4 MG capsule 24 hr capsule Take 1 capsule by mouth Every Night.     No current facility-administered medications on file prior to visit.      Current Medications:  Scheduled Meds:  Continuous Infusions:  No current facility-administered medications for this visit.   PRN Meds:.    I have reviewed the patient's medical history in detail and updated the computerized patient record.  Review and summarization of old records includes:    Past Medical History:   Diagnosis  Date   • Arthritis    • Cancer (CMS/HCC)    • COPD (chronic obstructive pulmonary disease) (CMS/HCC)    • Disease of thyroid gland    • Essential hypertension 12/27/2018   • Kidney calculi    • Prostate enlargement         Past Surgical History:   Procedure Laterality Date   • ANKLE OPEN REDUCTION INTERNAL FIXATION Right 12/25/2018    Procedure: INCISION AND DRAINAGE AND ORIF OF RIGHT ELECRONON;  Surgeon: Sung Fry MD;  Location: [facility];  Service: Orthopedics   • APPENDECTOMY     • COLON SURGERY     • EYE SURGERY     • KIDNEY SURGERY     • THYMECTOMY PARTIAL / TOTAL     • THYROID SURGERY          Social History     Occupational History   • Not on file   Tobacco Use   • Smoking status: Former Smoker     Types: Cigarettes   • Smokeless tobacco: Never Used   • Tobacco comment: quit 70 years ago   Substance and Sexual Activity   • Alcohol use: No   • Drug use: No   • Sexual activity: Defer      Social History     Social History Narrative   • Not on file      History reviewed. No pertinent family history.    ROS: 14 point review of systems was performed and all other systems were reviewed and are negative except for documented findings in HPI and today's encounter.     Allergies:   Allergies   Allergen Reactions   • Sulfa Antibiotics Rash     Constitutional:  Denies fever, shaking or chills   Eyes:  Denies change in visual acuity   HENT:  Denies nasal congestion or sore throat   Respiratory:  Denies cough or shortness of breath   Cardiovascular:  Denies chest pain or severe LE edema   GI:  Denies abdominal pain, nausea, vomiting, bloody stools or diarrhea   Musculoskeletal:  Numbness, tingling, pain, or loss of motor function only as noted above in history of present illness.  : Denies painful urination or hematuria  Integument:  Denies rash, lesion or ulceration   Neurologic:  Denies headache or focal weakness  Endocrine:  Denies lymphadenopathy  Psych:  Denies confusion or change in mental  "status   Hem:  Denies active bleeding    Subjective     Objective:    Physical Exam: 89 y.o. male  Wt Readings from Last 3 Encounters:   01/14/19 68 kg (150 lb)   12/26/18 71.9 kg (158 lb 9.6 oz)   11/30/17 64.4 kg (141 lb 14.4 oz)     Ht Readings from Last 3 Encounters:   01/14/19 180.3 cm (71\")   12/26/18 180.3 cm (71\")   11/30/17 177.8 cm (70\")     Body mass index is 20.92 kg/m².    Vitals:    01/14/19 1155   Temp: 97.9 °F (36.6 °C)       Vital signs reviewed.   General Appearance:    Alert, cooperative, in no acute distress                  Eyes: conjunctiva clear  ENT: external ears and nose atraumatic  CV: no peripheral edema  Resp: normal respiratory effort  Skin: no rashes or wounds; normal turgor  Psych: mood and affect appropriate  Lymph: no nodes appreciated  Neuro: gross sensation intact  Vascular:  Palpable peripheral pulse in noted extremity  Musculoskeletal Extremities: splint removed and the skin has healed well and abrasion gone and do drainage.  moves elbow (cautioned NOT to).        Radiology:   Imaging done today and discussed at length with the patient:    Indication: fracture follow up,  Views: 2V AP&LAT right elbow   Findings: progression of fracture healing as expected  Comparison views: viewed last xray done in the hospital.       Assessment:     ICD-10-CM ICD-9-CM   1. Injury of right elbow, initial encounter S59.901A 959.3   2. Open olecranon fracture, right, type I or II, with routine healing, subsequent encounter S52.021E V54.12        Procedures       Plan: Biomechanics of pertinent body area discussed.  Risks, benefits, alternatives, comparisons, and complications of accepted medicines, injections, recommendations, surgical procedures, and therapies explained and education provided in laymen's terms. The patient was given the opportunity to ask questions and they were answerved to their satisfaction.   Natural history and expected course of this patient's diagnosis discussed along with " evaluation of therapies. Questions answered.   Ordered brace and immobilizer.  No active movement of the elbow.  Fu 2 weeks with xrays out of the brace (gently)      1/14/2019

## 2019-01-16 ENCOUNTER — READMISSION MANAGEMENT (OUTPATIENT)
Dept: CALL CENTER | Facility: HOSPITAL | Age: 84
End: 2019-01-16

## 2019-01-16 NOTE — OUTREACH NOTE
General Surgery Week 3 Survey      Responses   Facility patient discharged from?  [facility]   Does the patient have one of the following disease processes/diagnoses(primary or secondary)?  General Surgery   Week 3 attempt successful?  No   Unsuccessful attempts  Attempt 1          Angle Sanchez RN

## 2019-01-18 ENCOUNTER — READMISSION MANAGEMENT (OUTPATIENT)
Dept: CALL CENTER | Facility: HOSPITAL | Age: 84
End: 2019-01-18

## 2019-01-18 NOTE — OUTREACH NOTE
General Surgery Week 3 Survey      Responses   Facility patient discharged from?  [place]   Does the patient have one of the following disease processes/diagnoses(primary or secondary)?  General Surgery   Week 3 attempt successful?  Yes   Call start time  0819   Call end time  0823   Discharge diagnosis  Open fracture of olecranon process of right ulna    Meds reviewed with patient/caregiver?  Yes   Is the patient taking all medications as directed (includes completed medication regime)?  Yes   Has the patient kept scheduled appointments due by today?  Yes   Comments  Pt reports surgeon visit went well and he is doing very well.   What is the patient's perception of their health status since discharge?  Improving   Is the patient/caregiver able to teach back steps to recovery at home?  Rest and rebuild strength, gradually increase activity   Week 3 call completed?  Yes          Briana Higuera RN

## 2019-01-30 ENCOUNTER — OFFICE VISIT (OUTPATIENT)
Dept: ORTHOPEDIC SURGERY | Facility: CLINIC | Age: 84
End: 2019-01-30

## 2019-01-30 VITALS — WEIGHT: 150 LBS | HEIGHT: 71 IN | TEMPERATURE: 97.3 F | BODY MASS INDEX: 21 KG/M2

## 2019-01-30 DIAGNOSIS — Z09 SURGICAL FOLLOWUP: Primary | ICD-10-CM

## 2019-01-30 DIAGNOSIS — S52.021E OLECRANON FRACTURE, RIGHT, OPEN TYPE I OR II, WITH ROUTINE HEALING, SUBSEQUENT ENCOUNTER: ICD-10-CM

## 2019-01-30 PROCEDURE — 99024 POSTOP FOLLOW-UP VISIT: CPT | Performed by: ORTHOPAEDIC SURGERY

## 2019-01-30 PROCEDURE — 73070 X-RAY EXAM OF ELBOW: CPT | Performed by: ORTHOPAEDIC SURGERY

## 2019-01-30 NOTE — PROGRESS NOTES
Patient Name: Jase Cardoza   YOB: 1927  Referring Primary Care Physician: Oxana Wise MD  BMI: Body mass index is 20.92 kg/m².    Chief Complaint:    Chief Complaint   Patient presents with   • Right Elbow - Follow-up        Subjective:    HPI:   Jase Cardoza is a pleasant 89 y.o. year old who presents today for evaluation of   Chief Complaint   Patient presents with   • Right Elbow - Follow-up    4-5 weeks sp open right olecranon fx.  Doing well.     This problem is not new to this examiner.     Medications:   Home Medications:  Current Outpatient Medications on File Prior to Visit   Medication Sig   • calcium carbonate (TUMS) 500 MG chewable tablet Chew 1,000 mg 4 (Four) Times a Day.   • fenofibrate (TRICOR) 145 MG tablet Take 160 mg by mouth Daily.   • levothyroxine (SYNTHROID, LEVOTHROID) 75 MCG tablet Take 1 tablet by mouth Daily. (Patient taking differently: Take 137 mcg by mouth Daily.)   • lisinopril (PRINIVIL,ZESTRIL) 10 MG tablet Take 10 mg by mouth Daily.   • omeprazole (priLOSEC) 20 MG capsule Take 20 mg by mouth Daily.   • polyethylene glycol (MIRALAX) packet Take 17 g by mouth Daily.   • tamsulosin (FLOMAX) 0.4 MG capsule 24 hr capsule Take 1 capsule by mouth Every Night.   • HYDROcodone-acetaminophen (NORCO) 7.5-325 MG per tablet Take 1 tablet by mouth Every 4 (Four) Hours As Needed for Moderate Pain .   • ondansetron (ZOFRAN) 4 MG tablet Take 1 tablet by mouth Every 6 (Six) Hours As Needed for Nausea or Vomiting.     No current facility-administered medications on file prior to visit.      Current Medications:  Scheduled Meds:  Continuous Infusions:  No current facility-administered medications for this visit.   PRN Meds:.    I have reviewed the patient's medical history in detail and updated the computerized patient record.  Review and summarization of old records includes:    Past Medical History:   Diagnosis Date   • Arthritis    • Cancer (CMS/HCC)    • COPD (chronic  obstructive pulmonary disease) (CMS/HCC)    • Disease of thyroid gland    • Essential hypertension 12/27/2018   • Kidney calculi    • Prostate enlargement         Past Surgical History:   Procedure Laterality Date   • ANKLE OPEN REDUCTION INTERNAL FIXATION Right 12/25/2018    Procedure: INCISION AND DRAINAGE AND ORIF OF RIGHT ELECRONON;  Surgeon: Sung Fry MD;  Location: [facility] OR;  Service: Orthopedics   • APPENDECTOMY     • COLON SURGERY     • EYE SURGERY     • KIDNEY SURGERY     • THYMECTOMY PARTIAL / TOTAL     • THYROID SURGERY          Social History     Occupational History   • Not on file   Tobacco Use   • Smoking status: Former Smoker     Types: Cigarettes   • Smokeless tobacco: Never Used   • Tobacco comment: quit 70 years ago   Substance and Sexual Activity   • Alcohol use: No   • Drug use: No   • Sexual activity: Defer    Social History     Social History Narrative   • Not on file      History reviewed. No pertinent family history.    ROS: 14 point review of systems was performed and all other systems were reviewed and are negative except for documented findings in HPI and today's encounter.     Allergies:   Allergies   Allergen Reactions   • Sulfa Antibiotics Rash     Constitutional:  Denies fever, shaking or chills   Eyes:  Denies change in visual acuity   HENT:  Denies nasal congestion or sore throat   Respiratory:  Denies cough or shortness of breath   Cardiovascular:  Denies chest pain or severe LE edema   GI:  Denies abdominal pain, nausea, vomiting, bloody stools or diarrhea   Musculoskeletal:  Numbness, tingling, pain, or loss of motor function only as noted above in history of present illness.  : Denies painful urination or hematuria  Integument:  Denies rash, lesion or ulceration   Neurologic:  Denies headache or focal weakness  Endocrine:  Denies lymphadenopathy  Psych:  Denies confusion or change in mental status   Hem:  Denies active bleeding    Subjective  "    Objective:    Physical Exam: 89 y.o. male  Wt Readings from Last 3 Encounters:   01/30/19 68 kg (150 lb)   01/14/19 68 kg (150 lb)   12/26/18 71.9 kg (158 lb 9.6 oz)     Ht Readings from Last 3 Encounters:   01/30/19 180.3 cm (71\")   01/14/19 180.3 cm (71\")   12/26/18 180.3 cm (71\")     Body mass index is 20.92 kg/m².    Vitals:    01/30/19 1408   Temp: 97.3 °F (36.3 °C)       Vital signs reviewed.   General Appearance:    Alert, cooperative, in no acute distress                  Eyes: conjunctiva clear  ENT: external ears and nose atraumatic  CV: no peripheral edema  Resp: normal respiratory effort  Skin: no rashes or wounds; normal turgor  Psych: mood and affect appropriate  Lymph: no nodes appreciated  Neuro: gross sensation intact  Vascular:  Palpable peripheral pulse in noted extremity  Musculoskeletal Extremities: can do arom, swelling dissipated.   brace bothering him      Radiology:   Imaging done today and discussed at length with the patient:    Indication: fracture follow up,  Views: 2V AP&LAT right elbow   Findings: maaintenance of fx reduction and metal position  Comparison views: viewed last xray done in the office.       Assessment:     ICD-10-CM ICD-9-CM   1. Surgical followup Z09 V67.00        Procedures       Plan: Biomechanics of pertinent body area discussed.  Risks, benefits, alternatives, comparisons, and complications of accepted medicines, injections, recommendations, surgical procedures, and therapies explained and education provided in laymen's terms. The patient was given the opportunity to ask questions and they were answerved to their satisfaction.   Natural history and expected course of this patient's diagnosis discussed along with evaluation of therapies. Questions answered.  PT referral.      1/30/2019  "

## 2019-02-19 ENCOUNTER — OFFICE VISIT (OUTPATIENT)
Dept: ORTHOPEDIC SURGERY | Facility: CLINIC | Age: 84
End: 2019-02-19

## 2019-02-19 VITALS — WEIGHT: 150 LBS | BODY MASS INDEX: 21 KG/M2 | HEIGHT: 71 IN | TEMPERATURE: 98.2 F

## 2019-02-19 DIAGNOSIS — S52.021E: ICD-10-CM

## 2019-02-19 DIAGNOSIS — M25.521 RIGHT ELBOW PAIN: Primary | ICD-10-CM

## 2019-02-19 PROCEDURE — 73070 X-RAY EXAM OF ELBOW: CPT | Performed by: ORTHOPAEDIC SURGERY

## 2019-02-19 PROCEDURE — 99024 POSTOP FOLLOW-UP VISIT: CPT | Performed by: ORTHOPAEDIC SURGERY

## 2019-02-19 NOTE — PROGRESS NOTES
"Patient Name: Jase Cardoza   YOB: 1927  Referring Primary Care Physician: Oxana Wise MD  BMI: Body mass index is 20.92 kg/m².    Chief Complaint:    Chief Complaint   Patient presents with   • Right Elbow - Follow-up        Subjective:    HPI:   Jase Cardoza is a pleasant 89 y.o. year old who presents today for evaluation of   Chief Complaint   Patient presents with   • Right Elbow - Follow-up    (Margaret Cardoza follows up today after his open olecranon fracture that he balbuena which makes him approximately 2 months out.  He is doing \"great\".  He can go - degrees actively flexing his arm he has full pronation and supination of the 2 pins are palpable but not prominent he is wearing a gel elbow pad that helps and he has a little scarring in the area there is no sign of recurrent drainage and he has good strength and really no pain.  Stained on December 26 this problem is not new to this examiner.     Medications:   Home Medications:  Current Outpatient Medications on File Prior to Visit   Medication Sig   • calcium carbonate (TUMS) 500 MG chewable tablet Chew 1,000 mg 4 (Four) Times a Day.   • fenofibrate (TRICOR) 145 MG tablet Take 160 mg by mouth Daily.   • HYDROcodone-acetaminophen (NORCO) 7.5-325 MG per tablet Take 1 tablet by mouth Every 4 (Four) Hours As Needed for Moderate Pain .   • levothyroxine (SYNTHROID, LEVOTHROID) 75 MCG tablet Take 1 tablet by mouth Daily. (Patient taking differently: Take 137 mcg by mouth Daily.)   • lisinopril (PRINIVIL,ZESTRIL) 10 MG tablet Take 10 mg by mouth Daily.   • omeprazole (priLOSEC) 20 MG capsule Take 20 mg by mouth Daily.   • ondansetron (ZOFRAN) 4 MG tablet Take 1 tablet by mouth Every 6 (Six) Hours As Needed for Nausea or Vomiting.   • polyethylene glycol (MIRALAX) packet Take 17 g by mouth Daily.   • tamsulosin (FLOMAX) 0.4 MG capsule 24 hr capsule Take 1 capsule by mouth Every Night.     No current facility-administered medications on file prior " to visit.      Current Medications:  Scheduled Meds:  Continuous Infusions:  No current facility-administered medications for this visit.   PRN Meds:.    I have reviewed the patient's medical history in detail and updated the computerized patient record.  Review and summarization of old records includes:    Past Medical History:   Diagnosis Date   • Arthritis    • Cancer (CMS/HCC)    • COPD (chronic obstructive pulmonary disease) (CMS/HCC)    • Disease of thyroid gland    • Essential hypertension 12/27/2018   • Kidney calculi    • Prostate enlargement         Past Surgical History:   Procedure Laterality Date   • ANKLE OPEN REDUCTION INTERNAL FIXATION Right 12/25/2018    Procedure: INCISION AND DRAINAGE AND ORIF OF RIGHT ELECRONON;  Surgeon: Sung Fry MD;  Location: [facility];  Service: Orthopedics   • APPENDECTOMY     • COLON SURGERY     • EYE SURGERY     • KIDNEY SURGERY     • THYMECTOMY PARTIAL / TOTAL     • THYROID SURGERY          Social History     Occupational History   • Not on file   Tobacco Use   • Smoking status: Former Smoker     Types: Cigarettes   • Smokeless tobacco: Never Used   • Tobacco comment: quit 70 years ago   Substance and Sexual Activity   • Alcohol use: No   • Drug use: No   • Sexual activity: Defer    Social History     Social History Narrative   • Not on file      History reviewed. No pertinent family history.    ROS: 14 point review of systems was performed and all other systems were reviewed and are negative except for documented findings in HPI and today's encounter.     Allergies:   Allergies   Allergen Reactions   • Sulfa Antibiotics Rash     Constitutional:  Denies fever, shaking or chills   Eyes:  Denies change in visual acuity   HENT:  Denies nasal congestion or sore throat   Respiratory:  Denies cough or shortness of breath   Cardiovascular:  Denies chest pain or severe LE edema   GI:  Denies abdominal pain, nausea, vomiting, bloody stools or diarrhea  "  Musculoskeletal:  Numbness, tingling, pain, or loss of motor function only as noted above in history of present illness.  : Denies painful urination or hematuria  Integument:  Denies rash, lesion or ulceration   Neurologic:  Denies headache or focal weakness  Endocrine:  Denies lymphadenopathy  Psych:  Denies confusion or change in mental status   Hem:  Denies active bleeding    Subjective     Objective:    Physical Exam: 89 y.o. male  Wt Readings from Last 3 Encounters:   02/19/19 68 kg (150 lb)   01/30/19 68 kg (150 lb)   01/14/19 68 kg (150 lb)     Ht Readings from Last 3 Encounters:   02/19/19 180.3 cm (71\")   01/30/19 180.3 cm (71\")   01/14/19 180.3 cm (71\")     Body mass index is 20.92 kg/m².    Vitals:    02/19/19 1016   Temp: 98.2 °F (36.8 °C)       Vital signs reviewed.   General Appearance:    Alert, cooperative, in no acute distress                  Eyes: conjunctiva clear  ENT: external ears and nose atraumatic  CV: no peripheral edema  Resp: normal respiratory effort  Skin: no rashes or wounds; normal turgor  Psych: mood and affect appropriate  Lymph: no nodes appreciated  Neuro: gross sensation intact  Vascular:  Palpable peripheral pulse in noted extremity  Musculoskeletal Extremities: see hpi      Radiology:   Imaging done today and discussed at length with the patient:    Indication: fracture follow up,  Views: 2V AP&LAT right elbow   Findings: progression of fracture healing as expected  Comparison views: viewed last xray done in the office.       Assessment:     ICD-10-CM ICD-9-CM   1. Right elbow pain M25.521 719.42   2. Open olecranon fracture, right, type I or II, with routine healing, subsequent encounter S52.021E V54.12        Procedures       Plan: Biomechanics of pertinent body area discussed.  Risks, benefits, alternatives, comparisons, and complications of accepted medicines, injections, recommendations, surgical procedures, and therapies explained and education provided in laymen's " terms. The patient was given the opportunity to ask questions and they were answerved to their satisfaction.   Natural history and expected course of this patient's diagnosis discussed along with evaluation of therapies. Questions answered.   Advice given he seems to be doing quite well and Tom has radiographic healing at this point told him things to look for but only see him back as necessary he is already surpassed the healing goals with age and severity of injury and congratulated him on his good progress and encouraged him to continue to lose ready for home program with therapy      2/19/2019

## 2019-09-11 ENCOUNTER — TELEPHONE (OUTPATIENT)
Dept: FAMILY MEDICINE CLINIC | Facility: CLINIC | Age: 84
End: 2019-09-11

## 2019-10-03 ENCOUNTER — OFFICE VISIT (OUTPATIENT)
Dept: FAMILY MEDICINE CLINIC | Facility: CLINIC | Age: 84
End: 2019-10-03

## 2019-10-03 VITALS
DIASTOLIC BLOOD PRESSURE: 78 MMHG | TEMPERATURE: 97.8 F | SYSTOLIC BLOOD PRESSURE: 140 MMHG | HEIGHT: 71 IN | HEART RATE: 66 BPM | OXYGEN SATURATION: 99 % | WEIGHT: 148 LBS | BODY MASS INDEX: 20.72 KG/M2

## 2019-10-03 DIAGNOSIS — I10 ESSENTIAL HYPERTENSION: Primary | ICD-10-CM

## 2019-10-03 DIAGNOSIS — E78.2 MIXED HYPERLIPIDEMIA: ICD-10-CM

## 2019-10-03 PROCEDURE — 99214 OFFICE O/P EST MOD 30 MIN: CPT | Performed by: FAMILY MEDICINE

## 2019-10-03 RX ORDER — FENOFIBRATE 145 MG/1
160 TABLET, COATED ORAL DAILY
Qty: 90 TABLET | Refills: 1 | Status: SHIPPED | OUTPATIENT
Start: 2019-10-03 | End: 2020-04-27 | Stop reason: SDUPTHER

## 2019-10-03 RX ORDER — LISINOPRIL 20 MG/1
20 TABLET ORAL DAILY
Qty: 90 TABLET | Refills: 1 | Status: SHIPPED | OUTPATIENT
Start: 2019-10-03 | End: 2020-04-27 | Stop reason: SDUPTHER

## 2019-10-03 NOTE — PROGRESS NOTES
Chief Complaint   Patient presents with   • Med Refill     Pt is here for refills on his cholesterol and hypertention medication        Subjective   Jase Cardoza is a 89 y.o. male.     History of Present Illness   PT is here for refills, labs and to get reestablished and   Hypertension-no chest pain, blurry vision, headaches or palpitations..  Hyperlipidemia- No myalgia.  No Complaints.  Stable.  Hypothyroidism- Stable and No significant weight change.  Denies heat or cold intolerance.  No palpitations. He sees specialist for this.    The following portions of the patient's history were reviewed and updated as appropriate: allergies, current medications, past family history, past medical history, past social history, past surgical history and problem list.    Review of Systems   Constitutional: Negative for fatigue.   Eyes: Negative for blurred vision.   Respiratory: Negative for cough, chest tightness and shortness of breath.    Cardiovascular: Negative for chest pain, palpitations and leg swelling.   Neurological: Negative for dizziness, light-headedness and headache.       Patient Active Problem List   Diagnosis   • Status post complete thyroidectomy   • Postsurgical hypoparathyroidism (CMS/HCC)   • COPD (chronic obstructive pulmonary disease) (CMS/HCC)   • BPH (benign prostatic hyperplasia)   • History of nephrectomy   • Hypocalcemia   • Fall on steps   • Open fracture of olecranon process of right ulna   • Essential hypertension   • Post-surgical hypothyroidism   • Closed fracture of sternum   • Injury of right elbow   • Open olecranon fracture, right, type I or II, with routine healing, subsequent encounter   • Thyroid nodule   • Stage III chronic kidney disease (CMS/HCC)   • Primary osteoarthritis of first carpometacarpal joint of left hand   • Hypertension   • Hyperlipidemia   • Hurthle cell adenoma of thyroid   • GERD without esophagitis       Allergies   Allergen Reactions   • Sulfa Antibiotics Rash          Current Outpatient Medications:   •  fenofibrate (TRICOR) 145 MG tablet, Take 1 tablet by mouth Daily., Disp: 90 tablet, Rfl: 1  •  levothyroxine (SYNTHROID, LEVOTHROID) 75 MCG tablet, Take 1 tablet by mouth Daily. (Patient taking differently: Take 137 mcg by mouth Daily.), Disp: 30 tablet, Rfl: 0  •  lisinopril (PRINIVIL,ZESTRIL) 20 MG tablet, Take 1 tablet by mouth Daily., Disp: 90 tablet, Rfl: 1  •  tamsulosin (FLOMAX) 0.4 MG capsule 24 hr capsule, Take 1 capsule by mouth Every Night., Disp: , Rfl:   •  calcium carbonate (TUMS) 500 MG chewable tablet, Chew 1,000 mg 4 (Four) Times a Day., Disp: , Rfl:   •  HYDROcodone-acetaminophen (NORCO) 7.5-325 MG per tablet, Take 1 tablet by mouth Every 4 (Four) Hours As Needed for Moderate Pain ., Disp: 25 tablet, Rfl: 0  •  omeprazole (priLOSEC) 20 MG capsule, Take 20 mg by mouth Daily., Disp: , Rfl:   •  ondansetron (ZOFRAN) 4 MG tablet, Take 1 tablet by mouth Every 6 (Six) Hours As Needed for Nausea or Vomiting., Disp: 10 tablet, Rfl: 0  •  polyethylene glycol (MIRALAX) packet, Take 17 g by mouth Daily., Disp: , Rfl:     Past Medical History:   Diagnosis Date   • Abnormal chest x-ray    • Arthritis    • BPH (benign prostatic hyperplasia)    • Cancer (CMS/HCC)    • COPD (chronic obstructive pulmonary disease) (CMS/HCC)    • Disease of thyroid gland    • Encounter for immunization    • Essential hypertension 12/27/2018   • Former smoker     Quit 70 years ago   • GERD without esophagitis    • History of allergic rhinitis    • History of colon cancer    • History of renal insufficiency    • Hurthle cell adenoma of thyroid    • Hyperlipidemia    • Hypertension    • Kidney calculi    • Nephroblastoma (CMS/HCC)     History of Nephroblastoma   • Primary osteoarthritis of first carpometacarpal joint of left hand    • Stage III chronic kidney disease (CMS/HCC)    • Thyroid nodule        Past Surgical History:   Procedure Laterality Date   • ANKLE OPEN REDUCTION INTERNAL  "FIXATION Right 12/25/2018    Procedure: INCISION AND DRAINAGE AND ORIF OF RIGHT ELECRONON;  Surgeon: Sung Fry MD;  Location: [facility];  Service: Orthopedics   • APPENDECTOMY     • COLON SURGERY     • EYE SURGERY     • KIDNEY SURGERY     • LUNG SURGERY     • THYMECTOMY PARTIAL / TOTAL     • THYROID SURGERY         Family History   Problem Relation Age of Onset   • Colon cancer Mother    • No Known Problems Other        Social History     Tobacco Use   • Smoking status: Former Smoker     Types: Cigarettes   • Smokeless tobacco: Never Used   • Tobacco comment: quit 70 years ago   Substance Use Topics   • Alcohol use: No     Comment: Caffeine use            Objective     Visit Vitals  /78   Pulse 66   Temp 97.8 °F (36.6 °C)   Ht 180.3 cm (71\")   Wt 67.1 kg (148 lb)   SpO2 99%   BMI 20.64 kg/m²       Physical Exam   Constitutional: He appears well-developed. No distress.   Eyes: Conjunctivae and lids are normal.   Neck: Trachea normal. Carotid bruit is not present. No thyroid mass and no thyromegaly present.   Cardiovascular: Normal rate, regular rhythm and normal heart sounds.   Pulmonary/Chest: Effort normal and breath sounds normal.   Lymphadenopathy:     He has no cervical adenopathy.   Neurological: He is alert. He is not disoriented.   Skin: Skin is warm and dry.   Psychiatric: He has a normal mood and affect. His speech is normal and behavior is normal. He is attentive.   Nursing note and vitals reviewed.      Lab Results   Component Value Date    GLUCOSE 95 12/28/2018    BUN 18 12/28/2018    CREATININE 1.12 12/28/2018    EGFRIFNONA 61 12/28/2018    BCR 16.1 12/28/2018    K 3.9 12/28/2018    CO2 26.0 12/28/2018    CALCIUM 8.7 12/28/2018    ALBUMIN 3.60 12/26/2018    AST 30 12/26/2018    ALT 17 12/26/2018       WBC   Date Value Ref Range Status   12/28/2018 5.83 4.50 - 10.70 10*3/mm3 Final     RBC   Date Value Ref Range Status   12/28/2018 3.57 (L) 4.60 - 6.00 10*6/mm3 Final     Hemoglobin "   Date Value Ref Range Status   12/28/2018 10.1 (L) 13.7 - 17.6 g/dL Final     Hematocrit   Date Value Ref Range Status   12/28/2018 32.3 (L) 40.4 - 52.2 % Final     MCV   Date Value Ref Range Status   12/28/2018 90.5 79.8 - 96.2 fL Final     MCH   Date Value Ref Range Status   12/28/2018 28.3 27.0 - 32.7 pg Final     MCHC   Date Value Ref Range Status   12/28/2018 31.3 (L) 32.6 - 36.4 g/dL Final     RDW   Date Value Ref Range Status   12/28/2018 15.1 (H) 11.5 - 14.5 % Final     RDW-SD   Date Value Ref Range Status   12/28/2018 50.3 37.0 - 54.0 fl Final     MPV   Date Value Ref Range Status   12/28/2018 10.0 6.0 - 12.0 fL Final     Platelets   Date Value Ref Range Status   12/28/2018 187 140 - 500 10*3/mm3 Final     Neutrophil %   Date Value Ref Range Status   12/28/2018 62.4 42.7 - 76.0 % Final     Lymphocyte %   Date Value Ref Range Status   12/28/2018 18.9 (L) 19.6 - 45.3 % Final     Monocyte %   Date Value Ref Range Status   12/28/2018 14.9 (H) 5.0 - 12.0 % Final     Eosinophil %   Date Value Ref Range Status   12/28/2018 3.6 0.3 - 6.2 % Final     Basophil %   Date Value Ref Range Status   12/28/2018 0.2 0.0 - 1.5 % Final     Immature Grans %   Date Value Ref Range Status   12/28/2018 0.5 0.0 - 0.5 % Final     Neutrophils, Absolute   Date Value Ref Range Status   12/28/2018 3.64 1.90 - 8.10 10*3/mm3 Final     Lymphocytes, Absolute   Date Value Ref Range Status   12/28/2018 1.10 0.90 - 4.80 10*3/mm3 Final     Monocytes, Absolute   Date Value Ref Range Status   12/28/2018 0.87 0.20 - 1.20 10*3/mm3 Final     Eosinophils, Absolute   Date Value Ref Range Status   12/28/2018 0.21 0.00 - 0.70 10*3/mm3 Final     Basophils, Absolute   Date Value Ref Range Status   12/28/2018 0.01 0.00 - 0.20 10*3/mm3 Final     Immature Grans, Absolute   Date Value Ref Range Status   12/28/2018 0.03 0.00 - 0.03 10*3/mm3 Final         Procedures    Assessment/Plan   Problems Addressed this Visit        Cardiovascular and Mediastinum     Essential hypertension - Primary    Relevant Medications    lisinopril (PRINIVIL,ZESTRIL) 20 MG tablet    Other Relevant Orders    Comprehensive Metabolic Panel    Hyperlipidemia    Relevant Medications    fenofibrate (TRICOR) 145 MG tablet    Other Relevant Orders    Lipid Panel          Orders Placed This Encounter   Procedures   • Lipid Panel   • Comprehensive Metabolic Panel       Current Outpatient Medications   Medication Sig Dispense Refill   • fenofibrate (TRICOR) 145 MG tablet Take 1 tablet by mouth Daily. 90 tablet 1   • levothyroxine (SYNTHROID, LEVOTHROID) 75 MCG tablet Take 1 tablet by mouth Daily. (Patient taking differently: Take 137 mcg by mouth Daily.) 30 tablet 0   • lisinopril (PRINIVIL,ZESTRIL) 20 MG tablet Take 1 tablet by mouth Daily. 90 tablet 1   • tamsulosin (FLOMAX) 0.4 MG capsule 24 hr capsule Take 1 capsule by mouth Every Night.     • calcium carbonate (TUMS) 500 MG chewable tablet Chew 1,000 mg 4 (Four) Times a Day.     • HYDROcodone-acetaminophen (NORCO) 7.5-325 MG per tablet Take 1 tablet by mouth Every 4 (Four) Hours As Needed for Moderate Pain . 25 tablet 0   • omeprazole (priLOSEC) 20 MG capsule Take 20 mg by mouth Daily.     • ondansetron (ZOFRAN) 4 MG tablet Take 1 tablet by mouth Every 6 (Six) Hours As Needed for Nausea or Vomiting. 10 tablet 0   • polyethylene glycol (MIRALAX) packet Take 17 g by mouth Daily.       No current facility-administered medications for this visit.      Refills and labs today  Return in about 3 months (around 1/3/2020).    There are no Patient Instructions on file for this visit.

## 2019-10-04 LAB
ALBUMIN SERPL-MCNC: 4.1 G/DL (ref 3.5–5.2)
ALBUMIN/GLOB SERPL: 1.2 G/DL
ALP SERPL-CCNC: 41 U/L (ref 39–117)
ALT SERPL-CCNC: 18 U/L (ref 1–41)
AST SERPL-CCNC: 26 U/L (ref 1–40)
BILIRUB SERPL-MCNC: 0.5 MG/DL (ref 0.2–1.2)
BUN SERPL-MCNC: 24 MG/DL (ref 8–23)
BUN/CREAT SERPL: 19.5 (ref 7–25)
CALCIUM SERPL-MCNC: 9.4 MG/DL (ref 8.2–9.6)
CHLORIDE SERPL-SCNC: 101 MMOL/L (ref 98–107)
CHOLEST SERPL-MCNC: 136 MG/DL (ref 0–200)
CO2 SERPL-SCNC: 27.5 MMOL/L (ref 22–29)
CREAT SERPL-MCNC: 1.23 MG/DL (ref 0.76–1.27)
GLOBULIN SER CALC-MCNC: 3.3 GM/DL
GLUCOSE SERPL-MCNC: 82 MG/DL (ref 65–99)
HDLC SERPL-MCNC: 48 MG/DL (ref 40–60)
LDLC SERPL CALC-MCNC: 72 MG/DL (ref 0–100)
POTASSIUM SERPL-SCNC: 4.6 MMOL/L (ref 3.5–5.2)
PROT SERPL-MCNC: 7.4 G/DL (ref 6–8.5)
SODIUM SERPL-SCNC: 142 MMOL/L (ref 136–145)
TRIGL SERPL-MCNC: 78 MG/DL (ref 0–150)
VLDLC SERPL CALC-MCNC: 15.6 MG/DL

## 2019-11-25 PROBLEM — Z00.00 ENCOUNTER FOR MEDICARE ANNUAL WELLNESS EXAM: Status: ACTIVE | Noted: 2019-11-25

## 2019-11-25 NOTE — PROGRESS NOTES
The ABCs of the Annual Wellness Visit  Subsequent Medicare Wellness Visit    Chief Complaint   Patient presents with   • Cerumen Impaction     Pt was here last month and forgot to mention he needed his ears cleaned out        Subjective   History of Present Illness:  Jase Cardoza is a 89 y.o. male who presents for a Subsequent Medicare Wellness Visit.  PT is here for ears feeling full and wants to get them checked out and MWE.       HEALTH RISK ASSESSMENT    Recent Hospitalizations:  No hospitalization(s) within the last year.    Current Medical Providers:  Patient Care Team:  Oxana Wise MD as PCP - General (Family Medicine)  Fredis Boston MD as PCP - Claims Attributed    Smoking Status:  Social History     Tobacco Use   Smoking Status Former Smoker   • Types: Cigarettes   Smokeless Tobacco Never Used   Tobacco Comment    quit 70 years ago       Alcohol Consumption:  Social History     Substance and Sexual Activity   Alcohol Use No    Comment: Caffeine use       Depression Screen:   PHQ-2/PHQ-9 Depression Screening 10/3/2019   Little interest or pleasure in doing things 0   Feeling down, depressed, or hopeless 0   Total Score 0       Fall Risk Screen:  TROY Fall Risk Assessment was completed, and patient is at LOW risk for falls.Assessment completed on:10/3/2019    Health Habits and Functional and Cognitive Screening:  Functional & Cognitive Status 11/26/2019   Do you have difficulty preparing food and eating? No   Do you have difficulty bathing yourself, getting dressed or grooming yourself? No   Do you have difficulty using the toilet? No   Do you have difficulty moving around from place to place? No   Do you have trouble with steps or getting out of a bed or a chair? No   Current Diet Well Balanced Diet   Dental Exam Up to date   Eye Exam Up to date   Exercise (times per week) 4 times per week   Current Exercise Activities Include Walking   Do you need help using the phone?  No   Are you deaf or do  you have serious difficulty hearing?  No   Do you need help with transportation? No   Do you need help shopping? No   Do you need help preparing meals?  No   Do you need help with housework?  No   Do you need help with laundry? No   Do you need help taking your medications? No   Do you need help managing money? No   Do you ever drive or ride in a car without wearing a seat belt? No   Have you felt unusual stress, anger or loneliness in the last month? No   Who do you live with? Alone   If you need help, do you have trouble finding someone available to you? No   Have you been bothered in the last four weeks by sexual problems? No   Do you have difficulty concentrating, remembering or making decisions? No         Does the patient have evidence of cognitive impairment? No    Asprin use counseling:Does not need ASA (and currently is not on it)    Age-appropriate Screening Schedule:  Refer to the list below for future screening recommendations based on patient's age, sex and/or medical conditions. Orders for these recommended tests are listed in the plan section. The patient has been provided with a written plan.    Health Maintenance   Topic Date Due   • ZOSTER VACCINE (1 of 2) 02/17/1977   • INFLUENZA VACCINE  08/01/2019   • LIPID PANEL  10/03/2020   • TDAP/TD VACCINES (2 - Td) 12/27/2028   • PNEUMOCOCCAL VACCINES (65+ LOW/MEDIUM RISK)  Completed          The following portions of the patient's history were reviewed and updated as appropriate: allergies, current medications, past family history, past medical history, past social history, past surgical history and problem list.    Outpatient Medications Prior to Visit   Medication Sig Dispense Refill   • calcium carbonate (TUMS) 500 MG chewable tablet Chew 1,000 mg 4 (Four) Times a Day.     • fenofibrate (TRICOR) 145 MG tablet Take 1 tablet by mouth Daily. 90 tablet 1   • HYDROcodone-acetaminophen (NORCO) 7.5-325 MG per tablet Take 1 tablet by mouth Every 4 (Four) Hours  As Needed for Moderate Pain . 25 tablet 0   • levothyroxine (SYNTHROID, LEVOTHROID) 75 MCG tablet Take 1 tablet by mouth Daily. (Patient taking differently: Take 137 mcg by mouth Daily.) 30 tablet 0   • lisinopril (PRINIVIL,ZESTRIL) 20 MG tablet Take 1 tablet by mouth Daily. 90 tablet 1   • omeprazole (priLOSEC) 20 MG capsule Take 20 mg by mouth Daily.     • ondansetron (ZOFRAN) 4 MG tablet Take 1 tablet by mouth Every 6 (Six) Hours As Needed for Nausea or Vomiting. 10 tablet 0   • polyethylene glycol (MIRALAX) packet Take 17 g by mouth Daily.     • tamsulosin (FLOMAX) 0.4 MG capsule 24 hr capsule Take 1 capsule by mouth Every Night.       No facility-administered medications prior to visit.        Patient Active Problem List   Diagnosis   • Status post complete thyroidectomy   • Postsurgical hypoparathyroidism (CMS/HCC)   • COPD (chronic obstructive pulmonary disease) (CMS/HCC)   • BPH (benign prostatic hyperplasia)   • History of nephrectomy   • Hypocalcemia   • Fall on steps   • Open fracture of olecranon process of right ulna   • Essential hypertension   • Post-surgical hypothyroidism   • Closed fracture of sternum   • Injury of right elbow   • Open olecranon fracture, right, type I or II, with routine healing, subsequent encounter   • Thyroid nodule   • Stage III chronic kidney disease (CMS/HCC)   • Primary osteoarthritis of first carpometacarpal joint of left hand   • Mixed hyperlipidemia   • Hurthle cell adenoma of thyroid   • GERD without esophagitis   • Medicare annual wellness visit, subsequent   • Impacted cerumen of both ears       Advanced Care Planning:  Patient does not have an advance directive - information provided to the patient today    Review of Systems   All other systems reviewed and are negative.      Compared to one year ago, the patient feels his physical health is the same.  Compared to one year ago, the patient feels his mental health is the same.    Reviewed chart for potential of high  "risk medication in the elderly: yes  Reviewed chart for potential of harmful drug interactions in the elderly:yes    Objective         Vitals:    11/26/19 1000   BP: 138/82   Pulse: 69   Temp: 98.4 °F (36.9 °C)   SpO2: 98%   Weight: 68 kg (150 lb)   Height: 180.3 cm (71\")       Body mass index is 20.92 kg/m².  Discussed the patient's BMI with him. The BMI is in the acceptable range  .    Physical Exam   Constitutional: He appears well-developed and well-nourished.   HENT:   Head: Normocephalic and atraumatic.   Right Ear: External ear normal.   Left Ear: External ear normal.   Nose: Nose normal.   Mouth/Throat: Oropharynx is clear and moist. No oropharyngeal exudate.   Cardiovascular: Normal rate, regular rhythm and normal heart sounds.   Nursing note and vitals reviewed.    Ear Cerumen Removal  Date/Time: 11/26/2019 11:26 AM  Performed by: Oxana Wise MD  Authorized by: Oxana Wise MD     Anesthesia:  Local Anesthetic: none  Location details: left ear and right ear  Patient tolerance: Patient tolerated the procedure well with no immediate complications  Procedure type: instrumentation and irrigation   Sedation:  Patient sedated: no          Lab Results   Component Value Date    GLU 82 10/03/2019    CHLPL 136 10/03/2019    TRIG 78 10/03/2019    HDL 48 10/03/2019    LDL 72 10/03/2019    VLDL 15.6 10/03/2019        Assessment/Plan   Medicare Risks and Personalized Health Plan  CMS Preventative Services Quick Reference  Advance Directive Discussion    The above risks/problems have been discussed with the patient.  Pertinent information has been shared with the patient in the After Visit Summary.  Follow up plans and orders are seen below in the Assessment/Plan Section.    Diagnoses and all orders for this visit:    1. Medicare annual wellness visit, subsequent (Primary)    2. Impacted cerumen of both ears    Other orders  -     Ear Cerumen Removal      Follow Up:  No Follow-up on file.     An After Visit " Summary and PPPS were given to the patient.   mwe done and given blank living will to pt and  Using flush and curette ears cleaned with no issues.  Use debrox as needed.

## 2019-11-26 ENCOUNTER — OFFICE VISIT (OUTPATIENT)
Dept: FAMILY MEDICINE CLINIC | Facility: CLINIC | Age: 84
End: 2019-11-26

## 2019-11-26 VITALS
WEIGHT: 150 LBS | OXYGEN SATURATION: 98 % | BODY MASS INDEX: 21 KG/M2 | SYSTOLIC BLOOD PRESSURE: 138 MMHG | DIASTOLIC BLOOD PRESSURE: 82 MMHG | TEMPERATURE: 98.4 F | HEIGHT: 71 IN | HEART RATE: 69 BPM

## 2019-11-26 DIAGNOSIS — Z00.00 MEDICARE ANNUAL WELLNESS VISIT, SUBSEQUENT: Primary | ICD-10-CM

## 2019-11-26 DIAGNOSIS — H61.23 IMPACTED CERUMEN OF BOTH EARS: ICD-10-CM

## 2019-11-26 PROCEDURE — 69210 REMOVE IMPACTED EAR WAX UNI: CPT | Performed by: FAMILY MEDICINE

## 2019-11-26 PROCEDURE — G0439 PPPS, SUBSEQ VISIT: HCPCS | Performed by: FAMILY MEDICINE

## 2020-04-27 ENCOUNTER — TELEMEDICINE (OUTPATIENT)
Dept: FAMILY MEDICINE CLINIC | Facility: CLINIC | Age: 85
End: 2020-04-27

## 2020-04-27 DIAGNOSIS — I10 ESSENTIAL HYPERTENSION: ICD-10-CM

## 2020-04-27 DIAGNOSIS — E78.2 MIXED HYPERLIPIDEMIA: ICD-10-CM

## 2020-04-27 DIAGNOSIS — E89.0 POST-SURGICAL HYPOTHYROIDISM: Primary | ICD-10-CM

## 2020-04-27 PROCEDURE — 99214 OFFICE O/P EST MOD 30 MIN: CPT | Performed by: FAMILY MEDICINE

## 2020-04-27 RX ORDER — FENOFIBRATE 145 MG/1
160 TABLET, COATED ORAL DAILY
Qty: 90 TABLET | Refills: 1 | Status: SHIPPED | OUTPATIENT
Start: 2020-04-27 | End: 2020-10-23 | Stop reason: SDUPTHER

## 2020-04-27 RX ORDER — LISINOPRIL 20 MG/1
20 TABLET ORAL DAILY
Qty: 90 TABLET | Refills: 1 | Status: SHIPPED | OUTPATIENT
Start: 2020-04-27 | End: 2020-10-23

## 2020-04-27 NOTE — PROGRESS NOTES
Unable to complete visit using a video connection to the patient. A phone visit was used to complete this visits. Total time of discussion was 18 minutes.  He was having poor reception with his cell phone so had to switch to home phone.      Chief Complaint   Patient presents with   • Hypertension   • Hyperlipidemia       Subjective   Jase Cardoza is a 89 y.o. male.     History of Present Illness   The patient presents today for follow-up via a video visit due to the   COVID-19 pandemic for  Hypertension-no chest pain, blurry vision, headaches or palpitations.   Hyperlipidemia- No myalgia.  No Complaints.  Stable.   Hypothyroidism- Stable and No significant weight change.  Denies heat or cold intolerance.  No palpitations. He sees specialist for this.          The following portions of the patient's history were reviewed and updated as appropriate: allergies, current medications, past family history, past medical history, past social history, past surgical history and problem list.    Review of Systems   Constitutional: Negative for fatigue.   Eyes: Negative for blurred vision.   Respiratory: Negative for cough, chest tightness and shortness of breath.    Cardiovascular: Negative for chest pain, palpitations and leg swelling.   Neurological: Negative for dizziness, light-headedness and headache.       Patient Active Problem List   Diagnosis   • Status post complete thyroidectomy   • Postsurgical hypoparathyroidism (CMS/HCC)   • COPD (chronic obstructive pulmonary disease) (CMS/HCC)   • BPH (benign prostatic hyperplasia)   • History of nephrectomy   • Hypocalcemia   • Fall on steps   • Open fracture of olecranon process of right ulna   • Essential hypertension   • Post-surgical hypothyroidism   • Closed fracture of sternum   • Injury of right elbow   • Open olecranon fracture, right, type I or II, with routine healing, subsequent encounter   • Thyroid nodule   • Stage III chronic kidney disease (CMS/HCC)   • Primary  osteoarthritis of first carpometacarpal joint of left hand   • Mixed hyperlipidemia   • Hurthle cell adenoma of thyroid   • GERD without esophagitis   • Medicare annual wellness visit, subsequent   • Impacted cerumen of both ears       Allergies   Allergen Reactions   • Sulfa Antibiotics Rash         Current Outpatient Medications:   •  calcium carbonate (TUMS) 500 MG chewable tablet, Chew 1,000 mg 4 (Four) Times a Day., Disp: , Rfl:   •  fenofibrate (TRICOR) 145 MG tablet, Take 1 tablet by mouth Daily., Disp: 90 tablet, Rfl: 1  •  HYDROcodone-acetaminophen (NORCO) 7.5-325 MG per tablet, Take 1 tablet by mouth Every 4 (Four) Hours As Needed for Moderate Pain ., Disp: 25 tablet, Rfl: 0  •  levothyroxine (SYNTHROID, LEVOTHROID) 75 MCG tablet, Take 1 tablet by mouth Daily. (Patient taking differently: Take 137 mcg by mouth Daily.), Disp: 30 tablet, Rfl: 0  •  lisinopril (PRINIVIL,ZESTRIL) 20 MG tablet, Take 1 tablet by mouth Daily., Disp: 90 tablet, Rfl: 1  •  omeprazole (priLOSEC) 20 MG capsule, Take 20 mg by mouth Daily., Disp: , Rfl:   •  ondansetron (ZOFRAN) 4 MG tablet, Take 1 tablet by mouth Every 6 (Six) Hours As Needed for Nausea or Vomiting., Disp: 10 tablet, Rfl: 0  •  polyethylene glycol (MIRALAX) packet, Take 17 g by mouth Daily., Disp: , Rfl:   •  tamsulosin (FLOMAX) 0.4 MG capsule 24 hr capsule, Take 1 capsule by mouth Every Night., Disp: , Rfl:     Past Medical History:   Diagnosis Date   • Abnormal chest x-ray    • Arthritis    • BPH (benign prostatic hyperplasia)    • Cancer (CMS/HCC)    • COPD (chronic obstructive pulmonary disease) (CMS/HCC)    • Encounter for immunization    • Essential hypertension 12/27/2018   • Former smoker     Quit 70 years ago   • GERD without esophagitis    • History of allergic rhinitis    • History of colon cancer    • History of renal insufficiency    • Hurthle cell adenoma of thyroid    • Kidney calculi    • Nephroblastoma (CMS/HCC)     History of Nephroblastoma   •  Primary osteoarthritis of first carpometacarpal joint of left hand    • Stage III chronic kidney disease (CMS/HCC)    • Thyroid nodule        Past Surgical History:   Procedure Laterality Date   • ANKLE OPEN REDUCTION INTERNAL FIXATION Right 12/25/2018    Procedure: INCISION AND DRAINAGE AND ORIF OF RIGHT ELECRONON;  Surgeon: Sung Fry MD;  Location: [facility];  Service: Orthopedics   • APPENDECTOMY     • COLON SURGERY     • EYE SURGERY     • KIDNEY SURGERY     • LUNG SURGERY     • THYMECTOMY PARTIAL / TOTAL     • THYROID SURGERY         Family History   Problem Relation Age of Onset   • Colon cancer Mother    • No Known Problems Other        Social History     Tobacco Use   • Smoking status: Former Smoker     Types: Cigarettes   • Smokeless tobacco: Never Used   • Tobacco comment: quit 70 years ago   Substance Use Topics   • Alcohol use: No     Comment: Caffeine use            Objective     There were no vitals taken for this visit. Video Visit    Physical Exam  NAD  AAOx3  No labored breathing.    Lab Results   Component Value Date    GLUCOSE 95 12/28/2018    BUN 24 (H) 10/03/2019    CREATININE 1.23 10/03/2019    EGFRIFNONA 55 (L) 10/03/2019    EGFRIFAFRI 67 10/03/2019    BCR 19.5 10/03/2019    K 4.6 10/03/2019    CO2 27.5 10/03/2019    CALCIUM 9.4 10/03/2019    PROTENTOTREF 7.4 10/03/2019    ALBUMIN 4.10 10/03/2019    LABIL2 1.2 10/03/2019    AST 26 10/03/2019    ALT 18 10/03/2019       WBC   Date Value Ref Range Status   12/28/2018 5.83 4.50 - 10.70 10*3/mm3 Final     RBC   Date Value Ref Range Status   12/28/2018 3.57 (L) 4.60 - 6.00 10*6/mm3 Final     Hemoglobin   Date Value Ref Range Status   12/28/2018 10.1 (L) 13.7 - 17.6 g/dL Final     Hematocrit   Date Value Ref Range Status   12/28/2018 32.3 (L) 40.4 - 52.2 % Final     MCV   Date Value Ref Range Status   12/28/2018 90.5 79.8 - 96.2 fL Final     MCH   Date Value Ref Range Status   12/28/2018 28.3 27.0 - 32.7 pg Final     MCHC   Date Value Ref  Range Status   12/28/2018 31.3 (L) 32.6 - 36.4 g/dL Final     RDW   Date Value Ref Range Status   12/28/2018 15.1 (H) 11.5 - 14.5 % Final     RDW-SD   Date Value Ref Range Status   12/28/2018 50.3 37.0 - 54.0 fl Final     MPV   Date Value Ref Range Status   12/28/2018 10.0 6.0 - 12.0 fL Final     Platelets   Date Value Ref Range Status   12/28/2018 187 140 - 500 10*3/mm3 Final     Neutrophil %   Date Value Ref Range Status   12/28/2018 62.4 42.7 - 76.0 % Final     Lymphocyte %   Date Value Ref Range Status   12/28/2018 18.9 (L) 19.6 - 45.3 % Final     Monocyte %   Date Value Ref Range Status   12/28/2018 14.9 (H) 5.0 - 12.0 % Final     Eosinophil %   Date Value Ref Range Status   12/28/2018 3.6 0.3 - 6.2 % Final     Basophil %   Date Value Ref Range Status   12/28/2018 0.2 0.0 - 1.5 % Final     Immature Grans %   Date Value Ref Range Status   12/28/2018 0.5 0.0 - 0.5 % Final     Neutrophils, Absolute   Date Value Ref Range Status   12/28/2018 3.64 1.90 - 8.10 10*3/mm3 Final     Lymphocytes, Absolute   Date Value Ref Range Status   12/28/2018 1.10 0.90 - 4.80 10*3/mm3 Final     Monocytes, Absolute   Date Value Ref Range Status   12/28/2018 0.87 0.20 - 1.20 10*3/mm3 Final     Eosinophils, Absolute   Date Value Ref Range Status   12/28/2018 0.21 0.00 - 0.70 10*3/mm3 Final     Basophils, Absolute   Date Value Ref Range Status   12/28/2018 0.01 0.00 - 0.20 10*3/mm3 Final     Immature Grans, Absolute   Date Value Ref Range Status   12/28/2018 0.03 0.00 - 0.03 10*3/mm3 Final       No results found for: HGBA1C    No results found for: LGVASSTX90    No results found for: TSH    No results found for: CHOL  Lab Results   Component Value Date    TRIG 78 10/03/2019     Lab Results   Component Value Date    HDL 48 10/03/2019     Lab Results   Component Value Date    LDL 72 10/03/2019     Lab Results   Component Value Date    VLDL 15.6 10/03/2019     No results found for: LDLHDL      Procedures    Assessment/Plan   Problems  Addressed this Visit        Cardiovascular and Mediastinum    Essential hypertension    Relevant Medications    lisinopril (PRINIVIL,ZESTRIL) 20 MG tablet    Mixed hyperlipidemia    Relevant Medications    fenofibrate (TRICOR) 145 MG tablet       Endocrine    Post-surgical hypothyroidism - Primary          No orders of the defined types were placed in this encounter.      Current Outpatient Medications   Medication Sig Dispense Refill   • calcium carbonate (TUMS) 500 MG chewable tablet Chew 1,000 mg 4 (Four) Times a Day.     • fenofibrate (TRICOR) 145 MG tablet Take 1 tablet by mouth Daily. 90 tablet 1   • HYDROcodone-acetaminophen (NORCO) 7.5-325 MG per tablet Take 1 tablet by mouth Every 4 (Four) Hours As Needed for Moderate Pain . 25 tablet 0   • levothyroxine (SYNTHROID, LEVOTHROID) 75 MCG tablet Take 1 tablet by mouth Daily. (Patient taking differently: Take 137 mcg by mouth Daily.) 30 tablet 0   • lisinopril (PRINIVIL,ZESTRIL) 20 MG tablet Take 1 tablet by mouth Daily. 90 tablet 1   • omeprazole (priLOSEC) 20 MG capsule Take 20 mg by mouth Daily.     • ondansetron (ZOFRAN) 4 MG tablet Take 1 tablet by mouth Every 6 (Six) Hours As Needed for Nausea or Vomiting. 10 tablet 0   • polyethylene glycol (MIRALAX) packet Take 17 g by mouth Daily.     • tamsulosin (FLOMAX) 0.4 MG capsule 24 hr capsule Take 1 capsule by mouth Every Night.       No current facility-administered medications for this visit.        Return in about 6 months (around 10/27/2020) for hypertension, hyperlipidema.    There are no Patient Instructions on file for this visit.       Refills on meds  And will get labs next visit  Time spent on visit: 18  minutes.  This patient has consented to a telehealth visit via video. The visit was scheduled as a video visit to comply with patient safety concerns in accordance with CDC recommendations.  All vitals recorded within this visit are reported by the patient.

## 2020-05-01 ENCOUNTER — TELEPHONE (OUTPATIENT)
Dept: FAMILY MEDICINE CLINIC | Facility: CLINIC | Age: 85
End: 2020-05-01

## 2020-05-01 NOTE — TELEPHONE ENCOUNTER
Patient said was called twice by our office this morning. Would like to know what we were needing. His callback is [phone]

## 2020-07-21 ENCOUNTER — OFFICE VISIT (OUTPATIENT)
Dept: FAMILY MEDICINE CLINIC | Facility: CLINIC | Age: 85
End: 2020-07-21

## 2020-07-21 ENCOUNTER — RESULTS ENCOUNTER (OUTPATIENT)
Dept: FAMILY MEDICINE CLINIC | Facility: CLINIC | Age: 85
End: 2020-07-21

## 2020-07-21 DIAGNOSIS — R19.7 DIARRHEA, UNSPECIFIED TYPE: Primary | ICD-10-CM

## 2020-07-21 DIAGNOSIS — R19.7 DIARRHEA, UNSPECIFIED TYPE: ICD-10-CM

## 2020-07-21 PROCEDURE — 99442 PR PHYS/QHP TELEPHONE EVALUATION 11-20 MIN: CPT | Performed by: FAMILY MEDICINE

## 2020-07-21 NOTE — PROGRESS NOTES
CC:  diarrhea    Subjective   Jase Cardoza is a 89 y.o. male.     History of Present Illness   The patient presents today for follow-up via a phone visit due to the COVID-19 pandemic for  4 day history of gas and diarrhea and some abd pain generalized across the upper stomach and now it is more generalized.  Some pain with BM.  No blood or mucous in stool.  He has had some normal bouts of diarrhea as well.  Stomach pain comes and goes.  His temp is 97.6.  No sob.  No Covid contact.  No loss of taste or smell.  Normal appetite.  No breathing issues.  He has not tried any otc meds for this.            The following portions of the patient's history were reviewed and updated as appropriate: allergies, current medications, past family history, past medical history, past social history, past surgical history and problem list.    Review of Systems see above      Patient Active Problem List   Diagnosis   • Status post complete thyroidectomy   • Postsurgical hypoparathyroidism (CMS/HCC)   • COPD (chronic obstructive pulmonary disease) (CMS/HCC)   • BPH (benign prostatic hyperplasia)   • History of nephrectomy   • Hypocalcemia   • Fall on steps   • Open fracture of olecranon process of right ulna   • Essential hypertension   • Post-surgical hypothyroidism   • Closed fracture of sternum   • Injury of right elbow   • Open olecranon fracture, right, type I or II, with routine healing, subsequent encounter   • Thyroid nodule   • Stage III chronic kidney disease (CMS/HCC)   • Primary osteoarthritis of first carpometacarpal joint of left hand   • Mixed hyperlipidemia   • Hurthle cell adenoma of thyroid   • GERD without esophagitis   • Medicare annual wellness visit, subsequent   • Impacted cerumen of both ears       Allergies   Allergen Reactions   • Sulfa Antibiotics Rash         Current Outpatient Medications:   •  calcium carbonate (TUMS) 500 MG chewable tablet, Chew 1,000 mg 4 (Four) Times a Day., Disp: , Rfl:   •   fenofibrate (TRICOR) 145 MG tablet, Take 1 tablet by mouth Daily., Disp: 90 tablet, Rfl: 1  •  HYDROcodone-acetaminophen (NORCO) 7.5-325 MG per tablet, Take 1 tablet by mouth Every 4 (Four) Hours As Needed for Moderate Pain ., Disp: 25 tablet, Rfl: 0  •  levothyroxine (SYNTHROID, LEVOTHROID) 75 MCG tablet, Take 1 tablet by mouth Daily. (Patient taking differently: Take 137 mcg by mouth Daily.), Disp: 30 tablet, Rfl: 0  •  lisinopril (PRINIVIL,ZESTRIL) 20 MG tablet, Take 1 tablet by mouth Daily., Disp: 90 tablet, Rfl: 1  •  omeprazole (priLOSEC) 20 MG capsule, Take 20 mg by mouth Daily., Disp: , Rfl:   •  ondansetron (ZOFRAN) 4 MG tablet, Take 1 tablet by mouth Every 6 (Six) Hours As Needed for Nausea or Vomiting., Disp: 10 tablet, Rfl: 0  •  polyethylene glycol (MIRALAX) packet, Take 17 g by mouth Daily., Disp: , Rfl:   •  tamsulosin (FLOMAX) 0.4 MG capsule 24 hr capsule, Take 1 capsule by mouth Every Night., Disp: , Rfl:     Past Medical History:   Diagnosis Date   • Abnormal chest x-ray    • Arthritis    • BPH (benign prostatic hyperplasia)    • Cancer (CMS/HCC)    • COPD (chronic obstructive pulmonary disease) (CMS/HCC)    • Encounter for immunization    • Essential hypertension 12/27/2018   • Former smoker     Quit 70 years ago   • GERD without esophagitis    • History of allergic rhinitis    • History of colon cancer    • History of renal insufficiency    • Hurthle cell adenoma of thyroid    • Kidney calculi    • Nephroblastoma (CMS/HCC)     History of Nephroblastoma   • Primary osteoarthritis of first carpometacarpal joint of left hand    • Stage III chronic kidney disease (CMS/HCC)    • Thyroid nodule        Past Surgical History:   Procedure Laterality Date   • ANKLE OPEN REDUCTION INTERNAL FIXATION Right 12/25/2018    Procedure: INCISION AND DRAINAGE AND ORIF OF RIGHT ELECRONON;  Surgeon: Sung Fry MD;  Location: [facility];  Service: Orthopedics   • APPENDECTOMY     • COLON SURGERY     • EYE  SURGERY     • KIDNEY SURGERY     • LUNG SURGERY     • THYMECTOMY PARTIAL / TOTAL     • THYROID SURGERY         Family History   Problem Relation Age of Onset   • Colon cancer Mother    • No Known Problems Other        Social History     Tobacco Use   • Smoking status: Former Smoker     Types: Cigarettes   • Smokeless tobacco: Never Used   • Tobacco comment: quit 70 years ago   Substance Use Topics   • Alcohol use: No     Comment: Caffeine use            Objective     There were no vitals taken for this visit. Video Visit    Physical Exam  NAD AAOx3  No labored Breathing.    Lab Results   Component Value Date    GLUCOSE 95 12/28/2018    BUN 24 (H) 10/03/2019    CREATININE 1.23 10/03/2019    EGFRIFNONA 55 (L) 10/03/2019    EGFRIFAFRI 67 10/03/2019    BCR 19.5 10/03/2019    K 4.6 10/03/2019    CO2 27.5 10/03/2019    CALCIUM 9.4 10/03/2019    PROTENTOTREF 7.4 10/03/2019    ALBUMIN 4.10 10/03/2019    LABIL2 1.2 10/03/2019    AST 26 10/03/2019    ALT 18 10/03/2019       WBC   Date Value Ref Range Status   12/28/2018 5.83 4.50 - 10.70 10*3/mm3 Final     RBC   Date Value Ref Range Status   12/28/2018 3.57 (L) 4.60 - 6.00 10*6/mm3 Final     Hemoglobin   Date Value Ref Range Status   12/28/2018 10.1 (L) 13.7 - 17.6 g/dL Final     Hematocrit   Date Value Ref Range Status   12/28/2018 32.3 (L) 40.4 - 52.2 % Final     MCV   Date Value Ref Range Status   12/28/2018 90.5 79.8 - 96.2 fL Final     MCH   Date Value Ref Range Status   12/28/2018 28.3 27.0 - 32.7 pg Final     MCHC   Date Value Ref Range Status   12/28/2018 31.3 (L) 32.6 - 36.4 g/dL Final     RDW   Date Value Ref Range Status   12/28/2018 15.1 (H) 11.5 - 14.5 % Final     RDW-SD   Date Value Ref Range Status   12/28/2018 50.3 37.0 - 54.0 fl Final     MPV   Date Value Ref Range Status   12/28/2018 10.0 6.0 - 12.0 fL Final     Platelets   Date Value Ref Range Status   12/28/2018 187 140 - 500 10*3/mm3 Final     Neutrophil %   Date Value Ref Range Status   12/28/2018 62.4  42.7 - 76.0 % Final     Lymphocyte %   Date Value Ref Range Status   12/28/2018 18.9 (L) 19.6 - 45.3 % Final     Monocyte %   Date Value Ref Range Status   12/28/2018 14.9 (H) 5.0 - 12.0 % Final     Eosinophil %   Date Value Ref Range Status   12/28/2018 3.6 0.3 - 6.2 % Final     Basophil %   Date Value Ref Range Status   12/28/2018 0.2 0.0 - 1.5 % Final     Immature Grans %   Date Value Ref Range Status   12/28/2018 0.5 0.0 - 0.5 % Final     Neutrophils, Absolute   Date Value Ref Range Status   12/28/2018 3.64 1.90 - 8.10 10*3/mm3 Final     Lymphocytes, Absolute   Date Value Ref Range Status   12/28/2018 1.10 0.90 - 4.80 10*3/mm3 Final     Monocytes, Absolute   Date Value Ref Range Status   12/28/2018 0.87 0.20 - 1.20 10*3/mm3 Final     Eosinophils, Absolute   Date Value Ref Range Status   12/28/2018 0.21 0.00 - 0.70 10*3/mm3 Final     Basophils, Absolute   Date Value Ref Range Status   12/28/2018 0.01 0.00 - 0.20 10*3/mm3 Final     Immature Grans, Absolute   Date Value Ref Range Status   12/28/2018 0.03 0.00 - 0.03 10*3/mm3 Final       No results found for: HGBA1C    No results found for: [id number]    No results found for: TSH    No results found for: CHOL  Lab Results   Component Value Date    TRIG 78 10/03/2019     Lab Results   Component Value Date    HDL 48 10/03/2019     Lab Results   Component Value Date    LDL 72 10/03/2019     Lab Results   Component Value Date    VLDL 15.6 10/03/2019     No results found for: LDLHDL      Procedures    Assessment/Plan   Problems Addressed this Visit     None      Visit Diagnoses     Diarrhea, unspecified type    -  Primary    Relevant Orders    COVID-19,[facility] ROUTINE, NP/OP SWAB IN TRANSPORT MEDIA OR ESWAB 72 HR TAT - Swab, Nasopharynx          Orders Placed This Encounter   Procedures   • COVID-19,[facility] ROUTINE, NP/OP SWAB IN TRANSPORT MEDIA OR ESWAB 72 HR TAT - Swab, Nasopharynx     Standing Status:   Future     Standing Expiration Date:   7/21/2021       Current  Outpatient Medications   Medication Sig Dispense Refill   • calcium carbonate (TUMS) 500 MG chewable tablet Chew 1,000 mg 4 (Four) Times a Day.     • fenofibrate (TRICOR) 145 MG tablet Take 1 tablet by mouth Daily. 90 tablet 1   • HYDROcodone-acetaminophen (NORCO) 7.5-325 MG per tablet Take 1 tablet by mouth Every 4 (Four) Hours As Needed for Moderate Pain . 25 tablet 0   • levothyroxine (SYNTHROID, LEVOTHROID) 75 MCG tablet Take 1 tablet by mouth Daily. (Patient taking differently: Take 137 mcg by mouth Daily.) 30 tablet 0   • lisinopril (PRINIVIL,ZESTRIL) 20 MG tablet Take 1 tablet by mouth Daily. 90 tablet 1   • omeprazole (priLOSEC) 20 MG capsule Take 20 mg by mouth Daily.     • ondansetron (ZOFRAN) 4 MG tablet Take 1 tablet by mouth Every 6 (Six) Hours As Needed for Nausea or Vomiting. 10 tablet 0   • polyethylene glycol (MIRALAX) packet Take 17 g by mouth Daily.     • tamsulosin (FLOMAX) 0.4 MG capsule 24 hr capsule Take 1 capsule by mouth Every Night.       No current facility-administered medications for this visit.        No follow-ups on file.    There are no Patient Instructions on file for this visit.     pt advised to use pepto and will get covid tested today.  I put order in.  Also needs to self quarantine.    Time spent on visit:  14   minutes.  This patient has consented to a telehealth visit via phone. The visit was scheduled as a phone visit to comply with patient safety concerns in accordance with CDC recommendations.  All vitals recorded within this visit are reported by the patient.

## 2020-10-23 ENCOUNTER — OFFICE VISIT (OUTPATIENT)
Dept: FAMILY MEDICINE CLINIC | Facility: CLINIC | Age: 85
End: 2020-10-23

## 2020-10-23 VITALS
HEART RATE: 70 BPM | WEIGHT: 150 LBS | HEIGHT: 71 IN | DIASTOLIC BLOOD PRESSURE: 77 MMHG | SYSTOLIC BLOOD PRESSURE: 167 MMHG | OXYGEN SATURATION: 97 % | BODY MASS INDEX: 21 KG/M2 | TEMPERATURE: 97.1 F

## 2020-10-23 DIAGNOSIS — E78.2 MIXED HYPERLIPIDEMIA: ICD-10-CM

## 2020-10-23 DIAGNOSIS — I10 ESSENTIAL HYPERTENSION: Primary | ICD-10-CM

## 2020-10-23 DIAGNOSIS — E89.0 POST-SURGICAL HYPOTHYROIDISM: ICD-10-CM

## 2020-10-23 DIAGNOSIS — R26.89 IMBALANCE: ICD-10-CM

## 2020-10-23 DIAGNOSIS — R29.898 WEAKNESS OF BOTH LOWER EXTREMITIES: ICD-10-CM

## 2020-10-23 DIAGNOSIS — N18.30 STAGE 3 CHRONIC KIDNEY DISEASE, UNSPECIFIED WHETHER STAGE 3A OR 3B CKD (HCC): ICD-10-CM

## 2020-10-23 PROCEDURE — 99214 OFFICE O/P EST MOD 30 MIN: CPT | Performed by: FAMILY MEDICINE

## 2020-10-23 PROCEDURE — G0008 ADMIN INFLUENZA VIRUS VAC: HCPCS | Performed by: FAMILY MEDICINE

## 2020-10-23 PROCEDURE — 90694 VACC AIIV4 NO PRSRV 0.5ML IM: CPT | Performed by: FAMILY MEDICINE

## 2020-10-23 RX ORDER — LISINOPRIL AND HYDROCHLOROTHIAZIDE 25; 20 MG/1; MG/1
1 TABLET ORAL DAILY
Qty: 90 TABLET | Refills: 1 | Status: SHIPPED | OUTPATIENT
Start: 2020-10-23 | End: 2021-04-23 | Stop reason: SDUPTHER

## 2020-10-23 RX ORDER — FENOFIBRATE 145 MG/1
160 TABLET, COATED ORAL DAILY
Qty: 90 TABLET | Refills: 1 | Status: SHIPPED | OUTPATIENT
Start: 2020-10-23 | End: 2021-04-23 | Stop reason: SDUPTHER

## 2020-10-23 NOTE — PROGRESS NOTES
Chief Complaint   Patient presents with   • leg weakness/pain       Subjective   Jase Cardoza is a 89 y.o. male.     History of Present Illness   Pt is here for refills, labs and  Hypertension-no chest pain, blurry vision, headaches or palpitations.  Hyperlipidemia- No myalgia.  No Complaints.  Stable.   RI- stable  Hypothyroidism- Stable and No significant weight change.  Denies heat or cold intolerance.  No palpitations.    He has been having some lower leg pain and some swelling in both ankles.  He is not taking any med for the leg pains.      The following portions of the patient's history were reviewed and updated as appropriate: allergies, current medications, past family history, past medical history, past social history, past surgical history and problem list.    Review of Systems   Constitutional: Negative for fatigue.   Eyes: Negative for blurred vision.   Respiratory: Negative for cough, chest tightness and shortness of breath.    Cardiovascular: Negative for chest pain, palpitations and leg swelling.   Neurological: Negative for dizziness, light-headedness and headache.       Patient Active Problem List   Diagnosis   • Status post complete thyroidectomy   • Postsurgical hypoparathyroidism (CMS/HCC)   • COPD (chronic obstructive pulmonary disease) (CMS/HCC)   • BPH (benign prostatic hyperplasia)   • History of nephrectomy   • Hypocalcemia   • Fall on steps   • Open fracture of olecranon process of right ulna   • Essential hypertension   • Post-surgical hypothyroidism   • Closed fracture of sternum   • Injury of right elbow   • Open olecranon fracture, right, type I or II, with routine healing, subsequent encounter   • Thyroid nodule   • Stage III chronic kidney disease   • Primary osteoarthritis of first carpometacarpal joint of left hand   • Mixed hyperlipidemia   • Hurthle cell adenoma of thyroid   • GERD without esophagitis   • Medicare annual wellness visit, subsequent   • Impacted cerumen of both  ears       Allergies   Allergen Reactions   • Sulfa Antibiotics Rash         Current Outpatient Medications:   •  calcium carbonate (TUMS) 500 MG chewable tablet, Chew 1,000 mg 4 (Four) Times a Day., Disp: , Rfl:   •  fenofibrate (TRICOR) 145 MG tablet, Take 1 tablet by mouth Daily., Disp: 90 tablet, Rfl: 1  •  HYDROcodone-acetaminophen (NORCO) 7.5-325 MG per tablet, Take 1 tablet by mouth Every 4 (Four) Hours As Needed for Moderate Pain ., Disp: 25 tablet, Rfl: 0  •  levothyroxine (SYNTHROID, LEVOTHROID) 75 MCG tablet, Take 1 tablet by mouth Daily. (Patient taking differently: Take 137 mcg by mouth Daily.), Disp: 30 tablet, Rfl: 0  •  omeprazole (priLOSEC) 20 MG capsule, Take 20 mg by mouth Daily., Disp: , Rfl:   •  ondansetron (ZOFRAN) 4 MG tablet, Take 1 tablet by mouth Every 6 (Six) Hours As Needed for Nausea or Vomiting., Disp: 10 tablet, Rfl: 0  •  polyethylene glycol (MIRALAX) packet, Take 17 g by mouth Daily., Disp: , Rfl:   •  tamsulosin (FLOMAX) 0.4 MG capsule 24 hr capsule, Take 1 capsule by mouth Every Night., Disp: , Rfl:   •  lisinopril-hydrochlorothiazide (Zestoretic) 20-25 MG per tablet, Take 1 tablet by mouth Daily., Disp: 90 tablet, Rfl: 1    Past Medical History:   Diagnosis Date   • Abnormal chest x-ray    • Arthritis    • BPH (benign prostatic hyperplasia)    • Cancer (CMS/HCC)    • COPD (chronic obstructive pulmonary disease) (CMS/HCC)    • Encounter for immunization    • Essential hypertension 12/27/2018   • Former smoker     Quit 70 years ago   • GERD without esophagitis    • History of allergic rhinitis    • History of colon cancer    • History of renal insufficiency    • Hurthle cell adenoma of thyroid    • Kidney calculi    • Nephroblastoma (CMS/HCC)     History of Nephroblastoma   • Primary osteoarthritis of first carpometacarpal joint of left hand    • Stage III chronic kidney disease    • Thyroid nodule        Past Surgical History:   Procedure Laterality Date   • ANKLE OPEN REDUCTION  "INTERNAL FIXATION Right 12/25/2018    Procedure: INCISION AND DRAINAGE AND ORIF OF RIGHT ELECRONON;  Surgeon: Sung Fry MD;  Location: [facility];  Service: Orthopedics   • APPENDECTOMY     • COLON SURGERY     • EYE SURGERY     • KIDNEY SURGERY     • LUNG SURGERY     • THYMECTOMY PARTIAL / TOTAL     • THYROID SURGERY         Family History   Problem Relation Age of Onset   • Colon cancer Mother    • No Known Problems Other        Social History     Tobacco Use   • Smoking status: Former Smoker     Types: Cigarettes   • Smokeless tobacco: Never Used   • Tobacco comment: quit 70 years ago   Substance Use Topics   • Alcohol use: No     Comment: Caffeine use            Objective     Visit Vitals  /77   Pulse 70   Temp 97.1 °F (36.2 °C)   Ht 180.3 cm (70.98\")   Wt 68 kg (150 lb)   SpO2 97%   BMI 20.93 kg/m²       Physical Exam    Lab Results   Component Value Date    GLUCOSE 95 12/28/2018    BUN 24 (H) 10/03/2019    CREATININE 1.23 10/03/2019    EGFRIFNONA 55 (L) 10/03/2019    EGFRIFAFRI 67 10/03/2019    BCR 19.5 10/03/2019    K 4.6 10/03/2019    CO2 27.5 10/03/2019    CALCIUM 9.4 10/03/2019    PROTENTOTREF 7.4 10/03/2019    ALBUMIN 4.10 10/03/2019    LABIL2 1.2 10/03/2019    AST 26 10/03/2019    ALT 18 10/03/2019       WBC   Date Value Ref Range Status   12/28/2018 5.83 4.50 - 10.70 10*3/mm3 Final     RBC   Date Value Ref Range Status   12/28/2018 3.57 (L) 4.60 - 6.00 10*6/mm3 Final     Hemoglobin   Date Value Ref Range Status   12/28/2018 10.1 (L) 13.7 - 17.6 g/dL Final     Hematocrit   Date Value Ref Range Status   12/28/2018 32.3 (L) 40.4 - 52.2 % Final     MCV   Date Value Ref Range Status   12/28/2018 90.5 79.8 - 96.2 fL Final     MCH   Date Value Ref Range Status   12/28/2018 28.3 27.0 - 32.7 pg Final     MCHC   Date Value Ref Range Status   12/28/2018 31.3 (L) 32.6 - 36.4 g/dL Final     RDW   Date Value Ref Range Status   12/28/2018 15.1 (H) 11.5 - 14.5 % Final     RDW-SD   Date Value Ref Range " Status   12/28/2018 50.3 37.0 - 54.0 fl Final     MPV   Date Value Ref Range Status   12/28/2018 10.0 6.0 - 12.0 fL Final     Platelets   Date Value Ref Range Status   12/28/2018 187 140 - 500 10*3/mm3 Final     Neutrophil %   Date Value Ref Range Status   12/28/2018 62.4 42.7 - 76.0 % Final     Lymphocyte %   Date Value Ref Range Status   12/28/2018 18.9 (L) 19.6 - 45.3 % Final     Monocyte %   Date Value Ref Range Status   12/28/2018 14.9 (H) 5.0 - 12.0 % Final     Eosinophil %   Date Value Ref Range Status   12/28/2018 3.6 0.3 - 6.2 % Final     Basophil %   Date Value Ref Range Status   12/28/2018 0.2 0.0 - 1.5 % Final     Immature Grans %   Date Value Ref Range Status   12/28/2018 0.5 0.0 - 0.5 % Final     Neutrophils, Absolute   Date Value Ref Range Status   12/28/2018 3.64 1.90 - 8.10 10*3/mm3 Final     Lymphocytes, Absolute   Date Value Ref Range Status   12/28/2018 1.10 0.90 - 4.80 10*3/mm3 Final     Monocytes, Absolute   Date Value Ref Range Status   12/28/2018 0.87 0.20 - 1.20 10*3/mm3 Final     Eosinophils, Absolute   Date Value Ref Range Status   12/28/2018 0.21 0.00 - 0.70 10*3/mm3 Final     Basophils, Absolute   Date Value Ref Range Status   12/28/2018 0.01 0.00 - 0.20 10*3/mm3 Final     Immature Grans, Absolute   Date Value Ref Range Status   12/28/2018 0.03 0.00 - 0.03 10*3/mm3 Final       No results found for: HGBA1C    No results found for: GFGULQZN35    No results found for: TSH    No results found for: CHOL  Lab Results   Component Value Date    TRIG 78 10/03/2019     Lab Results   Component Value Date    HDL 48 10/03/2019     Lab Results   Component Value Date    LDL 72 10/03/2019     Lab Results   Component Value Date    VLDL 15.6 10/03/2019     No results found for: LDLHDL      Procedures    Assessment/Plan   Problems Addressed this Visit        Cardiovascular and Mediastinum    Essential hypertension - Primary    Relevant Medications    lisinopril-hydrochlorothiazide (Zestoretic) 20-25 MG per  tablet    Other Relevant Orders    Comprehensive Metabolic Panel    Mixed hyperlipidemia    Relevant Medications    lisinopril-hydrochlorothiazide (Zestoretic) 20-25 MG per tablet    fenofibrate (TRICOR) 145 MG tablet    Other Relevant Orders    Lipid Panel       Endocrine    Post-surgical hypothyroidism    Relevant Orders    TSH       Genitourinary    Stage III chronic kidney disease    Relevant Orders    Comprehensive Metabolic Panel      Other Visit Diagnoses     Imbalance        Relevant Orders    Ambulatory Referral to Physical Therapy    Weakness of both lower extremities        Relevant Orders    Ambulatory Referral to Physical Therapy      Diagnoses       Codes Comments    Essential hypertension    -  Primary ICD-10-CM: I10  ICD-9-CM: 401.9     Mixed hyperlipidemia     ICD-10-CM: E78.2  ICD-9-CM: 272.2     Stage 3 chronic kidney disease, unspecified whether stage 3a or 3b CKD     ICD-10-CM: N18.30  ICD-9-CM: 585.3     Post-surgical hypothyroidism     ICD-10-CM: E89.0  ICD-9-CM: 244.0     Imbalance     ICD-10-CM: R26.89  ICD-9-CM: 781.2     Weakness of both lower extremities     ICD-10-CM: R29.898  ICD-9-CM: 729.89           Orders Placed This Encounter   Procedures   • Lipid Panel   • Comprehensive Metabolic Panel   • TSH   • Ambulatory Referral to Physical Therapy     Referral Priority:   Routine     Referral Type:   Therapy     Referral Reason:   Specialty Services Required     Requested Specialty:   Physical Therapy     Number of Visits Requested:   1       Current Outpatient Medications   Medication Sig Dispense Refill   • calcium carbonate (TUMS) 500 MG chewable tablet Chew 1,000 mg 4 (Four) Times a Day.     • fenofibrate (TRICOR) 145 MG tablet Take 1 tablet by mouth Daily. 90 tablet 1   • HYDROcodone-acetaminophen (NORCO) 7.5-325 MG per tablet Take 1 tablet by mouth Every 4 (Four) Hours As Needed for Moderate Pain . 25 tablet 0   • levothyroxine (SYNTHROID, LEVOTHROID) 75 MCG tablet Take 1 tablet by  mouth Daily. (Patient taking differently: Take 137 mcg by mouth Daily.) 30 tablet 0   • omeprazole (priLOSEC) 20 MG capsule Take 20 mg by mouth Daily.     • ondansetron (ZOFRAN) 4 MG tablet Take 1 tablet by mouth Every 6 (Six) Hours As Needed for Nausea or Vomiting. 10 tablet 0   • polyethylene glycol (MIRALAX) packet Take 17 g by mouth Daily.     • tamsulosin (FLOMAX) 0.4 MG capsule 24 hr capsule Take 1 capsule by mouth Every Night.     • lisinopril-hydrochlorothiazide (Zestoretic) 20-25 MG per tablet Take 1 tablet by mouth Daily. 90 tablet 1     No current facility-administered medications for this visit.      Refills and labs and will use tylenol for the pain.    Elevate legs.    No follow-ups on file.  Flu shot today.  There are no Patient Instructions on file for this visit.      Given warning signs for stroke and MI.    Start hctz and SE discussed.       (0) swallows foods/liquids without difficulty

## 2020-10-24 LAB
ALBUMIN SERPL-MCNC: 4.1 G/DL (ref 3.5–5.2)
ALBUMIN/GLOB SERPL: 1.4 G/DL
ALP SERPL-CCNC: 42 U/L (ref 39–117)
ALT SERPL-CCNC: 18 U/L (ref 1–41)
AST SERPL-CCNC: 24 U/L (ref 1–40)
BILIRUB SERPL-MCNC: 0.5 MG/DL (ref 0–1.2)
BUN SERPL-MCNC: 26 MG/DL (ref 8–23)
BUN/CREAT SERPL: 21.5 (ref 7–25)
CALCIUM SERPL-MCNC: 9.1 MG/DL (ref 8.2–9.6)
CHLORIDE SERPL-SCNC: 106 MMOL/L (ref 98–107)
CHOLEST SERPL-MCNC: 134 MG/DL (ref 0–200)
CO2 SERPL-SCNC: 25.9 MMOL/L (ref 22–29)
CREAT SERPL-MCNC: 1.21 MG/DL (ref 0.76–1.27)
GLOBULIN SER CALC-MCNC: 3 GM/DL
GLUCOSE SERPL-MCNC: 91 MG/DL (ref 65–99)
HDLC SERPL-MCNC: 49 MG/DL (ref 40–60)
LDLC SERPL CALC-MCNC: 70 MG/DL (ref 0–100)
POTASSIUM SERPL-SCNC: 4.4 MMOL/L (ref 3.5–5.2)
PROT SERPL-MCNC: 7.1 G/DL (ref 6–8.5)
SODIUM SERPL-SCNC: 141 MMOL/L (ref 136–145)
TRIGL SERPL-MCNC: 78 MG/DL (ref 0–150)
TSH SERPL DL<=0.005 MIU/L-ACNC: 2.94 UIU/ML (ref 0.27–4.2)
VLDLC SERPL CALC-MCNC: 15 MG/DL (ref 5–40)

## 2020-12-03 ENCOUNTER — OFFICE VISIT (OUTPATIENT)
Dept: FAMILY MEDICINE CLINIC | Facility: CLINIC | Age: 85
End: 2020-12-03

## 2020-12-03 DIAGNOSIS — Z00.00 MEDICARE ANNUAL WELLNESS VISIT, SUBSEQUENT: Primary | ICD-10-CM

## 2020-12-03 PROCEDURE — G0439 PPPS, SUBSEQ VISIT: HCPCS | Performed by: FAMILY MEDICINE

## 2020-12-03 NOTE — PROGRESS NOTES
The ABCs of the Annual Wellness Visit  Subsequent Medicare Wellness Visit  Pt has consented to a televisit due to COVID.  CC: MWE  Pt consented to a televisit due to Covid    Subjective   History of Present Illness:  Jase Cardoza is a 89 y.o. male who presents for a Subsequent Medicare Wellness Visit.    HEALTH RISK ASSESSMENT    Recent Hospitalizations:  No hospitalization(s) within the last year.    Current Medical Providers:  Patient Care Team:  Oxana Wise MD as PCP - General (Family Medicine)    Smoking Status:  Social History     Tobacco Use   Smoking Status Former Smoker   • Types: Cigarettes   Smokeless Tobacco Never Used   Tobacco Comment    quit 70 years ago       Alcohol Consumption:  Social History     Substance and Sexual Activity   Alcohol Use No    Comment: Caffeine use       Depression Screen:   PHQ-2/PHQ-9 Depression Screening 10/23/2020   Little interest or pleasure in doing things 0   Feeling down, depressed, or hopeless 0   Feeling tired or having little energy 0   Total Score 0       Fall Risk Screen:  TROY Fall Risk Assessment was completed, and patient is at LOW risk for falls.Assessment completed on:10/23/2020    Health Habits and Functional and Cognitive Screening:  Functional & Cognitive Status 12/3/2020   Do you have difficulty preparing food and eating? No   Do you have difficulty bathing yourself, getting dressed or grooming yourself? No   Do you have difficulty using the toilet? No   Do you have difficulty moving around from place to place? No   Do you have trouble with steps or getting out of a bed or a chair? No   Current Diet Well Balanced Diet   Dental Exam Up to date   Eye Exam Up to date   Exercise (times per week) 3 times per week   Current Exercises Include Other   Current Exercise Activities Include -   Do you need help using the phone?  No   Are you deaf or do you have serious difficulty hearing?  No   Do you need help with transportation? No   Do you need help  shopping? No   Do you need help preparing meals?  No   Do you need help with housework?  No   Do you need help with laundry? No   Do you need help taking your medications? No   Do you need help managing money? No   Do you ever drive or ride in a car without wearing a seat belt? No   Have you felt unusual stress, anger or loneliness in the last month? No   Who do you live with? Alone   If you need help, do you have trouble finding someone available to you? No   Have you been bothered in the last four weeks by sexual problems? No   Do you have difficulty concentrating, remembering or making decisions? No         Does the patient have evidence of cognitive impairment? No    Asprin use counseling:Does not need ASA (and currently is not on it)    Age-appropriate Screening Schedule:  Refer to the list below for future screening recommendations based on patient's age, sex and/or medical conditions. Orders for these recommended tests are listed in the plan section. The patient has been provided with a written plan.    Health Maintenance   Topic Date Due   • ZOSTER VACCINE (1 of 2) 02/17/1977   • LIPID PANEL  10/23/2021   • TDAP/TD VACCINES (2 - Td) 12/27/2028   • INFLUENZA VACCINE  Completed          The following portions of the patient's history were reviewed and updated as appropriate: allergies, current medications, past family history, past medical history, past social history, past surgical history and problem list.    Outpatient Medications Prior to Visit   Medication Sig Dispense Refill   • calcium carbonate (TUMS) 500 MG chewable tablet Chew 1,000 mg 4 (Four) Times a Day.     • fenofibrate (TRICOR) 145 MG tablet Take 1 tablet by mouth Daily. 90 tablet 1   • HYDROcodone-acetaminophen (NORCO) 7.5-325 MG per tablet Take 1 tablet by mouth Every 4 (Four) Hours As Needed for Moderate Pain . 25 tablet 0   • levothyroxine (SYNTHROID, LEVOTHROID) 75 MCG tablet Take 1 tablet by mouth Daily. (Patient taking differently: Take  137 mcg by mouth Daily.) 30 tablet 0   • lisinopril-hydrochlorothiazide (Zestoretic) 20-25 MG per tablet Take 1 tablet by mouth Daily. 90 tablet 1   • omeprazole (priLOSEC) 20 MG capsule Take 20 mg by mouth Daily.     • ondansetron (ZOFRAN) 4 MG tablet Take 1 tablet by mouth Every 6 (Six) Hours As Needed for Nausea or Vomiting. 10 tablet 0   • polyethylene glycol (MIRALAX) packet Take 17 g by mouth Daily.     • tamsulosin (FLOMAX) 0.4 MG capsule 24 hr capsule Take 1 capsule by mouth Every Night.       No facility-administered medications prior to visit.        Patient Active Problem List   Diagnosis   • Status post complete thyroidectomy   • Postsurgical hypoparathyroidism (CMS/HCC)   • COPD (chronic obstructive pulmonary disease) (CMS/HCC)   • BPH (benign prostatic hyperplasia)   • History of nephrectomy   • Hypocalcemia   • Fall on steps   • Open fracture of olecranon process of right ulna   • Essential hypertension   • Post-surgical hypothyroidism   • Closed fracture of sternum   • Injury of right elbow   • Open olecranon fracture, right, type I or II, with routine healing, subsequent encounter   • Thyroid nodule   • Stage III chronic kidney disease   • Primary osteoarthritis of first carpometacarpal joint of left hand   • Mixed hyperlipidemia   • Hurthle cell adenoma of thyroid   • GERD without esophagitis   • Medicare annual wellness visit, subsequent   • Impacted cerumen of both ears       Advanced Care Planning:  ACP discussion was held with the patient during this visit. Patient does not have an advance directive, information provided.    Review of Systems   All other systems reviewed and are negative.      Compared to one year ago, the patient feels his physical health is the same.  Compared to one year ago, the patient feels his mental health is the same.    Reviewed chart for potential of high risk medication in the elderly: yes  Reviewed chart for potential of harmful drug interactions in the  elderly:yes    Objective       There were no vitals filed for this visit.    There is no height or weight on file to calculate BMI.  Discussed the patient's BMI with him. The BMI is in the acceptable range.    Physical Exam   NAD  AAOx3  No labored breathing.    Lab Results   Component Value Date    GLU 91 10/23/2020    CHLPL 134 10/23/2020    TRIG 78 10/23/2020    HDL 49 10/23/2020    LDL 70 10/23/2020    VLDL 15 10/23/2020        Assessment/Plan   Medicare Risks and Personalized Health Plan  CMS Preventative Services Quick Reference  Advance Directive Discussion  Cardiovascular risk    The above risks/problems have been discussed with the patient.  Pertinent information has been shared with the patient in the After Visit Summary.  Follow up plans and orders are seen below in the Assessment/Plan Section.    Diagnoses and all orders for this visit:    1. Medicare annual wellness visit, subsequent (Primary)      Follow Up:  No follow-ups on file.     An After Visit Summary and PPPS were given to the patient.      MWE done and patient to work on diet and exercise for Preventive Counseling.    Pt will fill out the living will I gave him a while back.    MWE done and patient to work on diet and exercise for Preventive Counseling.

## 2021-04-23 ENCOUNTER — OFFICE VISIT (OUTPATIENT)
Dept: FAMILY MEDICINE CLINIC | Facility: CLINIC | Age: 86
End: 2021-04-23

## 2021-04-23 VITALS
HEIGHT: 71 IN | DIASTOLIC BLOOD PRESSURE: 92 MMHG | TEMPERATURE: 97.9 F | WEIGHT: 153 LBS | SYSTOLIC BLOOD PRESSURE: 146 MMHG | BODY MASS INDEX: 21.42 KG/M2 | HEART RATE: 74 BPM | OXYGEN SATURATION: 100 %

## 2021-04-23 DIAGNOSIS — E89.0 POST-SURGICAL HYPOTHYROIDISM: ICD-10-CM

## 2021-04-23 DIAGNOSIS — I10 ESSENTIAL HYPERTENSION: ICD-10-CM

## 2021-04-23 DIAGNOSIS — E78.2 MIXED HYPERLIPIDEMIA: Primary | ICD-10-CM

## 2021-04-23 PROCEDURE — 99214 OFFICE O/P EST MOD 30 MIN: CPT | Performed by: FAMILY MEDICINE

## 2021-04-23 RX ORDER — FENOFIBRATE 145 MG/1
160 TABLET, COATED ORAL DAILY
Qty: 90 TABLET | Refills: 1 | Status: SHIPPED | OUTPATIENT
Start: 2021-04-23 | End: 2021-10-08 | Stop reason: SDUPTHER

## 2021-04-23 RX ORDER — LISINOPRIL AND HYDROCHLOROTHIAZIDE 25; 20 MG/1; MG/1
1 TABLET ORAL DAILY
Qty: 90 TABLET | Refills: 1 | Status: SHIPPED | OUTPATIENT
Start: 2021-04-23 | End: 2021-10-08 | Stop reason: SDUPTHER

## 2021-04-23 RX ORDER — AMLODIPINE BESYLATE 2.5 MG/1
2.5 TABLET ORAL DAILY
Qty: 30 TABLET | Refills: 5 | Status: SHIPPED | OUTPATIENT
Start: 2021-04-23 | End: 2021-10-08 | Stop reason: SDUPTHER

## 2021-04-23 NOTE — PROGRESS NOTES
"Chief Complaint  Hypertension    Subjective          Jase Cardoza presents to [facility]  History of Present Illness  HTN- No CP or HA;  bp is still up some  HLD- no myalgia  He states PT has helped his legs and is doing better.    Objective   Vital Signs:   /92   Pulse 74   Temp 97.9 °F (36.6 °C)   Ht 180.3 cm (70.98\")   Wt 69.4 kg (153 lb)   SpO2 100%   BMI 21.35 kg/m²     Physical Exam   Result Review :     CMP    CMP 10/23/20   Glucose 91   BUN 26 (A)   Creatinine 1.21   eGFR Non  Am 56 (A)   eGFR African Am 68   Sodium 141   Potassium 4.4   Chloride 106   Calcium 9.1   Total Protein 7.1   Albumin 4.10   Globulin 3.0   Total Bilirubin 0.5   Alkaline Phosphatase 42   AST (SGOT) 24   ALT (SGPT) 18   (A) Abnormal value       Comments are available for some flowsheets but are not being displayed.           Lipid Panel    Lipid Panel 10/23/20   Total Cholesterol 134   Triglycerides 78   HDL Cholesterol 49   VLDL Cholesterol 15   LDL Cholesterol  70           TSH    TSH 10/23/20   TSH 2.940                     Assessment and Plan    Diagnoses and all orders for this visit:    1. Mixed hyperlipidemia (Primary)  -     Lipid Panel  -     fenofibrate (TRICOR) 145 MG tablet; Take 1 tablet by mouth Daily.  Dispense: 90 tablet; Refill: 1    2. Essential hypertension  -     Comprehensive Metabolic Panel  -     lisinopril-hydrochlorothiazide (Zestoretic) 20-25 MG per tablet; Take 1 tablet by mouth Daily.  Dispense: 90 tablet; Refill: 1  -     amLODIPine (NORVASC) 2.5 MG tablet; Take 1 tablet by mouth Daily.  Dispense: 30 tablet; Refill: 5    3. Post-surgical hypothyroidism  -     TSH        Follow Up   Return in about 6 months (around 10/23/2021).  Patient was given instructions and counseling regarding his condition or for health maintenance advice. Please see specific information pulled into the AVS if appropriate.     Refills and labs today.  He has had both covid " vaccines.  Start amlodipine 2.5   Given warning signs for stroke and MI.    Patient to monitor BP over next week and call me with readings at that time and we can make adjustments accordingly if needed.

## 2021-04-24 LAB
ALBUMIN SERPL-MCNC: 4.3 G/DL (ref 3.5–5.2)
ALBUMIN/GLOB SERPL: 1.5 G/DL
ALP SERPL-CCNC: 39 U/L (ref 39–117)
ALT SERPL-CCNC: 19 U/L (ref 1–41)
AST SERPL-CCNC: 25 U/L (ref 1–40)
BILIRUB SERPL-MCNC: 0.5 MG/DL (ref 0–1.2)
BUN SERPL-MCNC: 31 MG/DL (ref 8–23)
BUN/CREAT SERPL: 25.4 (ref 7–25)
CALCIUM SERPL-MCNC: 9.6 MG/DL (ref 8.2–9.6)
CHLORIDE SERPL-SCNC: 106 MMOL/L (ref 98–107)
CHOLEST SERPL-MCNC: 132 MG/DL (ref 0–200)
CO2 SERPL-SCNC: 26 MMOL/L (ref 22–29)
CREAT SERPL-MCNC: 1.22 MG/DL (ref 0.76–1.27)
GLOBULIN SER CALC-MCNC: 2.9 GM/DL
GLUCOSE SERPL-MCNC: 89 MG/DL (ref 65–99)
HDLC SERPL-MCNC: 48 MG/DL (ref 40–60)
LDLC SERPL CALC-MCNC: 68 MG/DL (ref 0–100)
POTASSIUM SERPL-SCNC: 4.4 MMOL/L (ref 3.5–5.2)
PROT SERPL-MCNC: 7.2 G/DL (ref 6–8.5)
SODIUM SERPL-SCNC: 141 MMOL/L (ref 136–145)
TRIGL SERPL-MCNC: 80 MG/DL (ref 0–150)
TSH SERPL DL<=0.005 MIU/L-ACNC: 3.23 UIU/ML (ref 0.27–4.2)
VLDLC SERPL CALC-MCNC: 16 MG/DL (ref 5–40)

## 2021-06-21 ENCOUNTER — OFFICE VISIT (OUTPATIENT)
Dept: FAMILY MEDICINE CLINIC | Facility: CLINIC | Age: 86
End: 2021-06-21

## 2021-06-21 VITALS
HEIGHT: 71 IN | SYSTOLIC BLOOD PRESSURE: 122 MMHG | OXYGEN SATURATION: 96 % | TEMPERATURE: 97.6 F | HEART RATE: 68 BPM | DIASTOLIC BLOOD PRESSURE: 78 MMHG | BODY MASS INDEX: 21.84 KG/M2 | WEIGHT: 156 LBS

## 2021-06-21 DIAGNOSIS — I83.812 VARICOSE VEINS OF LEFT LOWER EXTREMITY WITH PAIN: Primary | ICD-10-CM

## 2021-06-21 PROCEDURE — 99213 OFFICE O/P EST LOW 20 MIN: CPT | Performed by: FAMILY MEDICINE

## 2021-06-21 NOTE — PROGRESS NOTES
"Chief Complaint  Knee Pain (C/o pain behind left knee. He has a varicose vein that is protruding. Pt says the area is tender and when touched pain radiates down into calf. )    Subjective          Jase Cardoza presents to [facility]  History of Present Illness  PT is here for pain behind his left knee area due to big painful varicose veins when he pushes on it.  No pain otherwise.  It has gotten worse with time.    Objective   Vital Signs:   /78   Pulse 68   Temp 97.6 °F (36.4 °C)   Ht 180.3 cm (70.98\")   Wt 70.8 kg (156 lb)   SpO2 96%   BMI 21.77 kg/m²     Physical Exam   Result Review :{Labs  Result Review  Imaging  Med Tab  Media  Procedures :23}                 Assessment and Plan    There are no diagnoses linked to this encounter.    Follow Up   No follow-ups on file.  Patient was given instructions and counseling regarding his condition or for health maintenance advice. Please see specific information pulled into the AVS if appropriate.     Will elevate and get referral for vein specialist.  Elevate leg and use ice, heat and tylenol.  "

## 2021-08-12 ENCOUNTER — TELEPHONE (OUTPATIENT)
Dept: FAMILY MEDICINE CLINIC | Facility: CLINIC | Age: 86
End: 2021-08-12

## 2021-10-08 ENCOUNTER — OFFICE VISIT (OUTPATIENT)
Dept: FAMILY MEDICINE CLINIC | Facility: CLINIC | Age: 86
End: 2021-10-08

## 2021-10-08 VITALS
HEIGHT: 71 IN | TEMPERATURE: 97.5 F | RESPIRATION RATE: 16 BRPM | BODY MASS INDEX: 20.58 KG/M2 | DIASTOLIC BLOOD PRESSURE: 64 MMHG | WEIGHT: 147 LBS | SYSTOLIC BLOOD PRESSURE: 138 MMHG

## 2021-10-08 DIAGNOSIS — E78.2 MIXED HYPERLIPIDEMIA: Primary | ICD-10-CM

## 2021-10-08 DIAGNOSIS — I10 ESSENTIAL HYPERTENSION: ICD-10-CM

## 2021-10-08 DIAGNOSIS — Z23 IMMUNIZATION DUE: ICD-10-CM

## 2021-10-08 DIAGNOSIS — E89.0 POST-SURGICAL HYPOTHYROIDISM: ICD-10-CM

## 2021-10-08 PROCEDURE — G0008 ADMIN INFLUENZA VIRUS VAC: HCPCS | Performed by: FAMILY MEDICINE

## 2021-10-08 PROCEDURE — 99214 OFFICE O/P EST MOD 30 MIN: CPT | Performed by: FAMILY MEDICINE

## 2021-10-08 PROCEDURE — 90662 IIV NO PRSV INCREASED AG IM: CPT | Performed by: FAMILY MEDICINE

## 2021-10-08 RX ORDER — LISINOPRIL AND HYDROCHLOROTHIAZIDE 25; 20 MG/1; MG/1
1 TABLET ORAL DAILY
Qty: 90 TABLET | Refills: 1 | Status: SHIPPED | OUTPATIENT
Start: 2021-10-08 | End: 2022-01-01 | Stop reason: SDUPTHER

## 2021-10-08 RX ORDER — AMLODIPINE BESYLATE 2.5 MG/1
2.5 TABLET ORAL DAILY
Qty: 30 TABLET | Refills: 5 | Status: SHIPPED | OUTPATIENT
Start: 2021-10-08 | End: 2022-01-01

## 2021-10-08 RX ORDER — LEVOTHYROXINE SODIUM 137 MCG
TABLET ORAL
COMMUNITY
Start: 2021-09-12

## 2021-10-08 RX ORDER — FENOFIBRATE 145 MG/1
160 TABLET, COATED ORAL DAILY
Qty: 90 TABLET | Refills: 1 | Status: SHIPPED | OUTPATIENT
Start: 2021-10-08 | End: 2022-01-01 | Stop reason: SDUPTHER

## 2021-10-08 NOTE — PROGRESS NOTES
"Chief Complaint  Hyperlipidemia    Subjective          Jase Cardoza presents to [facility]  History of Present Illness  PT is here for refills, labs and  HTN- no CP or HA  HLD- on med and no myalgia and trying to stay active  He had recent prostate surgery and doing well with it.    Hypothyroidism- stable and not taking any med for this.    Objective   Vital Signs:   /64 (BP Location: Left arm, Patient Position: Sitting, Cuff Size: Adult)   Temp 97.5 °F (36.4 °C) (Temporal)   Resp 16   Ht 180.3 cm (70.98\")   Wt 66.7 kg (147 lb)   BMI 20.51 kg/m²     Physical Exam  Vitals and nursing note reviewed.   Constitutional:       Appearance: Normal appearance.   Cardiovascular:      Rate and Rhythm: Normal rate and regular rhythm.      Heart sounds: Normal heart sounds.   Pulmonary:      Effort: Pulmonary effort is normal.      Breath sounds: Normal breath sounds. No rales.   Abdominal:      General: Abdomen is flat.      Palpations: Abdomen is soft.   Neurological:      Mental Status: He is alert.   Psychiatric:         Mood and Affect: Mood normal.         Behavior: Behavior normal.        Result Review :                 Assessment and Plan    Diagnoses and all orders for this visit:    1. Mixed hyperlipidemia (Primary)  -     Lipid Panel  -     fenofibrate (TRICOR) 145 MG tablet; Take 1 tablet by mouth Daily.  Dispense: 90 tablet; Refill: 1    2. Essential hypertension  -     Comprehensive Metabolic Panel  -     amLODIPine (NORVASC) 2.5 MG tablet; Take 1 tablet by mouth Daily.  Dispense: 30 tablet; Refill: 5  -     lisinopril-hydrochlorothiazide (Zestoretic) 20-25 MG per tablet; Take 1 tablet by mouth Daily.  Dispense: 90 tablet; Refill: 1    3. Post-surgical hypothyroidism  -     TSH    4. Immunization due  -     Fluzone High-Dose 65+yrs ([id number])        Follow Up {Instructions Charge Capture  Follow-up Communications :23}  Return in about 8 weeks (around 12/4/2021), or MWE " televisit..  Patient was given instructions and counseling regarding his condition or for health maintenance advice. Please see specific information pulled into the AVS if appropriate.     Refills and labs today.

## 2021-10-09 LAB
ALBUMIN SERPL-MCNC: 4.3 G/DL (ref 3.5–5.2)
ALBUMIN/GLOB SERPL: 1.6 G/DL
ALP SERPL-CCNC: 41 U/L (ref 39–117)
ALT SERPL-CCNC: 18 U/L (ref 1–41)
AST SERPL-CCNC: 24 U/L (ref 1–40)
BILIRUB SERPL-MCNC: 0.5 MG/DL (ref 0–1.2)
BUN SERPL-MCNC: 26 MG/DL (ref 8–23)
BUN/CREAT SERPL: 22.8 (ref 7–25)
CALCIUM SERPL-MCNC: 9.6 MG/DL (ref 8.2–9.6)
CHLORIDE SERPL-SCNC: 102 MMOL/L (ref 98–107)
CHOLEST SERPL-MCNC: 135 MG/DL (ref 0–200)
CO2 SERPL-SCNC: 27.3 MMOL/L (ref 22–29)
CREAT SERPL-MCNC: 1.14 MG/DL (ref 0.76–1.27)
GLOBULIN SER CALC-MCNC: 2.7 GM/DL
GLUCOSE SERPL-MCNC: 91 MG/DL (ref 65–99)
HDLC SERPL-MCNC: 47 MG/DL (ref 40–60)
LDLC SERPL CALC-MCNC: 72 MG/DL (ref 0–100)
POTASSIUM SERPL-SCNC: 4.2 MMOL/L (ref 3.5–5.2)
PROT SERPL-MCNC: 7 G/DL (ref 6–8.5)
SODIUM SERPL-SCNC: 137 MMOL/L (ref 136–145)
TRIGL SERPL-MCNC: 80 MG/DL (ref 0–150)
TSH SERPL DL<=0.005 MIU/L-ACNC: 1.27 UIU/ML (ref 0.27–4.2)
VLDLC SERPL CALC-MCNC: 16 MG/DL (ref 5–40)

## 2021-12-13 ENCOUNTER — TELEMEDICINE (OUTPATIENT)
Dept: FAMILY MEDICINE CLINIC | Facility: CLINIC | Age: 86
End: 2021-12-13

## 2021-12-13 DIAGNOSIS — Z00.00 MEDICARE ANNUAL WELLNESS VISIT, SUBSEQUENT: Primary | ICD-10-CM

## 2021-12-13 PROCEDURE — 1170F FXNL STATUS ASSESSED: CPT | Performed by: FAMILY MEDICINE

## 2021-12-13 PROCEDURE — G0439 PPPS, SUBSEQ VISIT: HCPCS | Performed by: FAMILY MEDICINE

## 2021-12-13 PROCEDURE — 1159F MED LIST DOCD IN RCRD: CPT | Performed by: FAMILY MEDICINE

## 2021-12-13 NOTE — PROGRESS NOTES
The ABCs of the Annual Wellness Visit  Subsequent Medicare Wellness Visit    Chief complaint: Medicare wellness exam  Subjective    History of Present Illness: She consented to a televisit due to Covid 19.  Jase Cardoza is a 89 y.o. male who presents for a Subsequent Medicare Wellness Visit.    The following portions of the patient's history were reviewed and   updated as appropriate: allergies, current medications, past family history, past medical history, past social history, past surgical history and problem list.    Compared to one year ago, the patient feels his physical   health is the same.    Compared to one year ago, the patient feels his mental   health is the same.    Recent Hospitalizations:  He was not admitted to the hospital during the last year.       Current Medical Providers:  Patient Care Team:  Oxana Wise MD as PCP - General (Family Medicine)    Outpatient Medications Prior to Visit   Medication Sig Dispense Refill   • amLODIPine (NORVASC) 2.5 MG tablet Take 1 tablet by mouth Daily. 30 tablet 5   • fenofibrate (TRICOR) 145 MG tablet Take 1 tablet by mouth Daily. 90 tablet 1   • lisinopril-hydrochlorothiazide (Zestoretic) 20-25 MG per tablet Take 1 tablet by mouth Daily. 90 tablet 1   • Synthroid 137 MCG tablet      • tamsulosin (FLOMAX) 0.4 MG capsule 24 hr capsule Take 1 capsule by mouth Every Night.       No facility-administered medications prior to visit.       No opioid medication identified on active medication list. I have reviewed chart for other potential  high risk medication/s and harmful drug interactions in the elderly.          Aspirin is not on active medication list.  not on asa.    Patient Active Problem List   Diagnosis   • Status post complete thyroidectomy   • Postsurgical hypoparathyroidism (HCC)   • COPD (chronic obstructive pulmonary disease) (HCC)   • BPH (benign prostatic hyperplasia)   • History of nephrectomy   • Hypocalcemia   • Fall on steps   • Open fracture  of olecranon process of right ulna   • Essential hypertension   • Post-surgical hypothyroidism   • Closed fracture of sternum   • Injury of right elbow   • Open olecranon fracture, right, type I or II, with routine healing, subsequent encounter   • Thyroid nodule   • Stage III chronic kidney disease (HCC)   • Primary osteoarthritis of first carpometacarpal joint of left hand   • Mixed hyperlipidemia   • Hurthle cell adenoma of thyroid   • GERD without esophagitis   • Medicare annual wellness visit, subsequent   • Impacted cerumen of both ears   • Varicose veins of left lower extremity with pain     Advance Care Planning  Advance Directive is not on file.  ACP discussion was held with the patient during this visit. Patient has an advance directive (not in EMR), copy requested.          Objective    There were no vitals filed for this visit.  BMI Readings from Last 1 Encounters:   10/08/21 20.51 kg/m²   BMI is within normal parameters. No follow-up required.    Does the patient have evidence of cognitive impairment? No    Physical Exam     Awake alert and oriented x3  No labored breathing  No acute distress  Lab Results   Component Value Date    CHLPL 135 10/08/2021    TRIG 80 10/08/2021    HDL 47 10/08/2021    LDL 72 10/08/2021    VLDL 16 10/08/2021            HEALTH RISK ASSESSMENT    Smoking Status:  Social History     Tobacco Use   Smoking Status Former Smoker   • Types: Cigarettes   Smokeless Tobacco Never Used   Tobacco Comment    quit 70 years ago     Alcohol Consumption:  Social History     Substance and Sexual Activity   Alcohol Use No    Comment: Caffeine use     Fall Risk Screen:    TROY Fall Risk Assessment was completed, and patient is at LOW risk for falls.Assessment completed on:4/23/2021    Depression Screening:  PHQ-2/PHQ-9 Depression Screening 4/23/2021   Little interest or pleasure in doing things 0   Feeling down, depressed, or hopeless 0   Feeling tired or having little energy -   Total Score 0        Health Habits and Functional and Cognitive Screening:  Functional & Cognitive Status 12/13/2021   Do you have difficulty preparing food and eating? No   Do you have difficulty bathing yourself, getting dressed or grooming yourself? No   Do you have difficulty using the toilet? No   Do you have difficulty moving around from place to place? No   Do you have trouble with steps or getting out of a bed or a chair? No   Current Diet Well Balanced Diet   Dental Exam Up to date   Eye Exam Up to date   Exercise (times per week) 1 times per week   Current Exercises Include Walking   Current Exercise Activities Include -   Do you need help using the phone?  No   Are you deaf or do you have serious difficulty hearing?  No   Do you need help with transportation? No   Do you need help shopping? No   Do you need help preparing meals?  No   Do you need help with housework?  No   Do you need help with laundry? No   Do you need help taking your medications? No   Do you need help managing money? No   Do you ever drive or ride in a car without wearing a seat belt? No   Have you felt unusual stress, anger or loneliness in the last month? No   Who do you live with? Alone   If you need help, do you have trouble finding someone available to you? No   Have you been bothered in the last four weeks by sexual problems? No   Do you have difficulty concentrating, remembering or making decisions? No       Age-appropriate Screening Schedule:  Refer to the list below for future screening recommendations based on patient's age, sex and/or medical conditions. Orders for these recommended tests are listed in the plan section. The patient has been provided with a written plan.    Health Maintenance   Topic Date Due   • ZOSTER VACCINE (1 of 2) 02/17/1977   • LIPID PANEL  10/08/2022   • TDAP/TD VACCINES (2 - Td or Tdap) 12/27/2028   • INFLUENZA VACCINE  Completed              Assessment/Plan   CMS Preventative Services Quick Reference  Risk Factors  Identified During Encounter  Cardiovascular Disease  The above risks/problems have been discussed with the patient.  Follow up actions/plans if indicated are seen below in the Assessment/Plan Section.  Pertinent information has been shared with the patient in the After Visit Summary.    Diagnoses and all orders for this visit:    1. Medicare annual wellness visit, subsequent (Primary)        Follow Up:   No follow-ups on file.     An After Visit Summary and PPPS were made available to the patient.                    MWE done and patient to work on diet and exercise for Preventive Counseling.    25 minutes spent on this visit total.  Patient has a follow-up in April for his regular visits.

## 2022-01-01 ENCOUNTER — OFFICE VISIT (OUTPATIENT)
Dept: FAMILY MEDICINE CLINIC | Facility: CLINIC | Age: 87
End: 2022-01-01

## 2022-01-01 ENCOUNTER — TELEMEDICINE (OUTPATIENT)
Dept: FAMILY MEDICINE CLINIC | Facility: CLINIC | Age: 87
End: 2022-01-01

## 2022-01-01 VITALS
WEIGHT: 138 LBS | SYSTOLIC BLOOD PRESSURE: 138 MMHG | RESPIRATION RATE: 18 BRPM | TEMPERATURE: 97.5 F | HEIGHT: 71 IN | BODY MASS INDEX: 19.32 KG/M2 | DIASTOLIC BLOOD PRESSURE: 76 MMHG

## 2022-01-01 VITALS
HEIGHT: 71 IN | TEMPERATURE: 97.1 F | RESPIRATION RATE: 16 BRPM | BODY MASS INDEX: 19.65 KG/M2 | DIASTOLIC BLOOD PRESSURE: 78 MMHG | WEIGHT: 140.4 LBS | SYSTOLIC BLOOD PRESSURE: 120 MMHG

## 2022-01-01 DIAGNOSIS — Z23 IMMUNIZATION DUE: ICD-10-CM

## 2022-01-01 DIAGNOSIS — E89.0 POST-SURGICAL HYPOTHYROIDISM: ICD-10-CM

## 2022-01-01 DIAGNOSIS — E78.2 MIXED HYPERLIPIDEMIA: ICD-10-CM

## 2022-01-01 DIAGNOSIS — N18.30 STAGE 3 CHRONIC KIDNEY DISEASE, UNSPECIFIED WHETHER STAGE 3A OR 3B CKD: ICD-10-CM

## 2022-01-01 DIAGNOSIS — I10 ESSENTIAL HYPERTENSION: ICD-10-CM

## 2022-01-01 DIAGNOSIS — Z00.00 MEDICARE ANNUAL WELLNESS VISIT, SUBSEQUENT: Primary | ICD-10-CM

## 2022-01-01 DIAGNOSIS — I10 ESSENTIAL HYPERTENSION: Primary | ICD-10-CM

## 2022-01-01 LAB
ALBUMIN SERPL-MCNC: 3.9 G/DL (ref 3.5–5.2)
ALBUMIN SERPL-MCNC: 4.1 G/DL (ref 3.5–4.6)
ALBUMIN/GLOB SERPL: 1.4 G/DL
ALBUMIN/GLOB SERPL: 1.4 {RATIO} (ref 1.2–2.2)
ALP SERPL-CCNC: 44 U/L (ref 39–117)
ALP SERPL-CCNC: 46 IU/L (ref 44–121)
ALT SERPL-CCNC: 17 U/L (ref 1–41)
ALT SERPL-CCNC: 19 IU/L (ref 0–44)
AST SERPL-CCNC: 23 U/L (ref 1–40)
AST SERPL-CCNC: 27 IU/L (ref 0–40)
BILIRUB SERPL-MCNC: 0.4 MG/DL (ref 0–1.2)
BILIRUB SERPL-MCNC: 0.6 MG/DL (ref 0–1.2)
BUN SERPL-MCNC: 29 MG/DL (ref 8–23)
BUN SERPL-MCNC: 32 MG/DL (ref 10–36)
BUN/CREAT SERPL: 24.8 (ref 7–25)
BUN/CREAT SERPL: 27 (ref 10–24)
CALCIUM SERPL-MCNC: 9.4 MG/DL (ref 8.6–10.2)
CALCIUM SERPL-MCNC: 9.6 MG/DL (ref 8.2–9.6)
CHLORIDE SERPL-SCNC: 102 MMOL/L (ref 96–106)
CHLORIDE SERPL-SCNC: 102 MMOL/L (ref 98–107)
CHOLEST SERPL-MCNC: 132 MG/DL (ref 0–200)
CHOLEST SERPL-MCNC: 134 MG/DL (ref 100–199)
CO2 SERPL-SCNC: 24 MMOL/L (ref 20–29)
CO2 SERPL-SCNC: 27.1 MMOL/L (ref 22–29)
CREAT SERPL-MCNC: 1.17 MG/DL (ref 0.76–1.27)
CREAT SERPL-MCNC: 1.18 MG/DL (ref 0.76–1.27)
EGFRCR SERPLBLD CKD-EPI 2021: 57 ML/MIN/1.73
EGFRCR SERPLBLD CKD-EPI 2021: 57.4 ML/MIN/1.73
GLOBULIN SER CALC-MCNC: 2.8 GM/DL
GLOBULIN SER CALC-MCNC: 3 G/DL (ref 1.5–4.5)
GLUCOSE SERPL-MCNC: 86 MG/DL (ref 65–99)
GLUCOSE SERPL-MCNC: 87 MG/DL (ref 65–99)
HDLC SERPL-MCNC: 44 MG/DL (ref 40–60)
HDLC SERPL-MCNC: 47 MG/DL
LDLC SERPL CALC-MCNC: 72 MG/DL (ref 0–100)
LDLC SERPL CALC-MCNC: 72 MG/DL (ref 0–99)
POTASSIUM SERPL-SCNC: 4 MMOL/L (ref 3.5–5.2)
POTASSIUM SERPL-SCNC: 4.5 MMOL/L (ref 3.5–5.2)
PROT SERPL-MCNC: 6.7 G/DL (ref 6–8.5)
PROT SERPL-MCNC: 7.1 G/DL (ref 6–8.5)
SODIUM SERPL-SCNC: 141 MMOL/L (ref 136–145)
SODIUM SERPL-SCNC: 142 MMOL/L (ref 134–144)
TRIGL SERPL-MCNC: 72 MG/DL (ref 0–149)
TRIGL SERPL-MCNC: 79 MG/DL (ref 0–150)
TSH SERPL DL<=0.005 MIU/L-ACNC: 1.9 UIU/ML (ref 0.27–4.2)
TSH SERPL DL<=0.005 MIU/L-ACNC: 2.58 UIU/ML (ref 0.45–4.5)
VLDLC SERPL CALC-MCNC: 15 MG/DL (ref 5–40)
VLDLC SERPL CALC-MCNC: 16 MG/DL (ref 5–40)

## 2022-01-01 PROCEDURE — 1170F FXNL STATUS ASSESSED: CPT | Performed by: FAMILY MEDICINE

## 2022-01-01 PROCEDURE — 0124A COVID-19 (PFIZER) BIVALENT BOOSTER 12+YRS: CPT | Performed by: FAMILY MEDICINE

## 2022-01-01 PROCEDURE — 99214 OFFICE O/P EST MOD 30 MIN: CPT | Performed by: FAMILY MEDICINE

## 2022-01-01 PROCEDURE — 91312 COVID-19 (PFIZER) BIVALENT BOOSTER 12+YRS: CPT | Performed by: FAMILY MEDICINE

## 2022-01-01 PROCEDURE — 1160F RVW MEDS BY RX/DR IN RCRD: CPT | Performed by: FAMILY MEDICINE

## 2022-01-01 PROCEDURE — G0439 PPPS, SUBSEQ VISIT: HCPCS | Performed by: FAMILY MEDICINE

## 2022-01-01 PROCEDURE — 90662 IIV NO PRSV INCREASED AG IM: CPT | Performed by: FAMILY MEDICINE

## 2022-01-01 PROCEDURE — G0008 ADMIN INFLUENZA VIRUS VAC: HCPCS | Performed by: FAMILY MEDICINE

## 2022-01-01 RX ORDER — AMLODIPINE BESYLATE 2.5 MG/1
2.5 TABLET ORAL DAILY
Qty: 90 TABLET | Refills: 1 | Status: SHIPPED | OUTPATIENT
Start: 2022-01-01 | End: 2022-01-01 | Stop reason: SDUPTHER

## 2022-01-01 RX ORDER — LISINOPRIL AND HYDROCHLOROTHIAZIDE 25; 20 MG/1; MG/1
1 TABLET ORAL DAILY
Qty: 90 TABLET | Refills: 1 | Status: SHIPPED | OUTPATIENT
Start: 2022-01-01 | End: 2022-01-01 | Stop reason: SDUPTHER

## 2022-01-01 RX ORDER — LISINOPRIL AND HYDROCHLOROTHIAZIDE 25; 20 MG/1; MG/1
1 TABLET ORAL DAILY
Qty: 90 TABLET | Refills: 1 | Status: SHIPPED | OUTPATIENT
Start: 2022-01-01

## 2022-01-01 RX ORDER — AMLODIPINE BESYLATE 2.5 MG/1
2.5 TABLET ORAL DAILY
Qty: 90 TABLET | Refills: 1 | Status: SHIPPED | OUTPATIENT
Start: 2022-01-01

## 2022-01-01 RX ORDER — AMLODIPINE BESYLATE 2.5 MG/1
TABLET ORAL
Qty: 30 TABLET | Refills: 1 | Status: SHIPPED | OUTPATIENT
Start: 2022-01-01 | End: 2022-01-01 | Stop reason: SDUPTHER

## 2022-01-01 RX ORDER — FENOFIBRATE 145 MG/1
160 TABLET, COATED ORAL DAILY
Qty: 90 TABLET | Refills: 1 | Status: SHIPPED | OUTPATIENT
Start: 2022-01-01 | End: 2022-01-01 | Stop reason: SDUPTHER

## 2022-01-01 RX ORDER — FENOFIBRATE 145 MG/1
160 TABLET, COATED ORAL DAILY
Qty: 90 TABLET | Refills: 1 | Status: SHIPPED | OUTPATIENT
Start: 2022-01-01

## 2022-03-04 NOTE — TELEPHONE ENCOUNTER
Rx Refill Note  Requested Prescriptions     Pending Prescriptions Disp Refills   • amLODIPine (NORVASC) 2.5 MG tablet [Pharmacy Med Name: amLODIPine BESYLATE 2.5 MG TAB] 30 tablet 5     Sig: TAKE ONE TABLET BY MOUTH DAILY      Last office visit with prescribing clinician: 10/8/2021      Next office visit with prescribing clinician: 4/8/2022            Raf Santo MA  03/04/22, 07:06 EST

## 2022-04-14 NOTE — PROGRESS NOTES
"Chief Complaint  Hyperlipidemia    Subjective          Jase Cardoza presents to [facility]  History of Present Illness  PT is here for refills, labs and  HTN- No CP or HA  HLD- no myalgia and is staying active.    Hypothyroidism- stable.  No med  Objective   Vital Signs:   /78 (BP Location: Left arm, Patient Position: Sitting, Cuff Size: Large Adult)   Temp 97.1 °F (36.2 °C) (Temporal)   Resp 16   Ht 180.3 cm (70.98\")   Wt 63.7 kg (140 lb 6.4 oz)   BMI 19.59 kg/m²     BMI is within normal parameters. No follow-up required.      Physical Exam  Vitals and nursing note reviewed.   Constitutional:       Appearance: He is well-developed.   Cardiovascular:      Rate and Rhythm: Normal rate and regular rhythm.      Heart sounds: Normal heart sounds. No murmur heard.  Pulmonary:      Effort: Pulmonary effort is normal. No respiratory distress.      Breath sounds: Normal breath sounds. No stridor. No wheezing or rhonchi.   Neurological:      General: No focal deficit present.      Mental Status: He is alert and oriented to person, place, and time.   Psychiatric:         Mood and Affect: Mood normal.         Behavior: Behavior normal.        Result Review :                 Assessment and Plan    Diagnoses and all orders for this visit:    1. Essential hypertension (Primary)  -     Comprehensive Metabolic Panel  -     lisinopril-hydrochlorothiazide (Zestoretic) 20-25 MG per tablet; Take 1 tablet by mouth Daily.  Dispense: 90 tablet; Refill: 1  -     amLODIPine (NORVASC) 2.5 MG tablet; Take 1 tablet by mouth Daily.  Dispense: 90 tablet; Refill: 1    2. Mixed hyperlipidemia  -     Lipid Panel  -     fenofibrate (TRICOR) 145 MG tablet; Take 1 tablet by mouth Daily.  Dispense: 90 tablet; Refill: 1    3. Post-surgical hypothyroidism  -     TSH        Follow Up   Return in about 6 months (around 10/14/2022) for hypertension, hyperlipidema.  Patient was given instructions and counseling " regarding his condition or for health maintenance advice. Please see specific information pulled into the AVS if appropriate.     Refills and labs today.  Continue exercise.

## 2022-06-04 NOTE — H&P
Name: Jase Cardoza ADMIT: 2017   : 1927  PCP: No Known Provider    MRN: [id number] LOS: 0 days   AGE/SEX: 89 y.o. male  ROOM: 423/1     No chief complaint on file.      Subjective   Mr. Cardoza is a 89 y.o. male who presents to [facility] after having a completion thyroidectomy earlier today. Intraoperative PTH was concerning low and Dr. Cifuentes wanted admitted for monitoring. He was initially seen by Dr. Mchugh in July for a right-sided thyroid nodule. After further evaluation it was felt this was likely a malignancy and he underwent a right-sided hemithyroidectomy on 2017. This was found to be a thyroid carcinoma and his endocrinologist suggested a full thyroidectomy and postoperative radioactive iodine treatments. He underwent completion thyroidectomy today and parathyroid hormone levels were obtained and found to be low. Dr. Mchugh is concerned about developing significant hypocalcemia and wanted patient admitted to the hospital to a monitored bed for close observation. He'll be seen by endocrinology as well. At the time of my visit he is doing very well. Has minor pain in his throat from the ET tube and otherwise denies pain. Denies chest pain, shortness of breath, nausea, vomiting or diarrhea. Has been in a very good state of health leading up to today's surgery.      History of Present Illness    Past Medical History:   Diagnosis Date   • Arthritis    • Cancer    • COPD (chronic obstructive pulmonary disease)    • Disease of thyroid gland    • Kidney calculi    • Prostate enlargement    Prior nephrectomy due to renal cell carcinoma. Done by Dr. Dimitri Brooks.  Hypertension, COPD, GERD, dyslipidemia    Past Surgical History:   Procedure Laterality Date   • APPENDECTOMY     • COLON SURGERY     • EYE SURGERY     • KIDNEY SURGERY     • THYMECTOMY PARTIAL / TOTAL       History reviewed. No pertinent family history.  Social History   Substance Use Topics   • Smoking status:  Former Smoker     Types: Cigarettes   • Smokeless tobacco: None      Comment: quit 70 years ago   • Alcohol use No     Prescriptions Prior to Admission   Medication Sig Dispense Refill Last Dose   • fenofibrate (TRICOR) 145 MG tablet Take 160 mg by mouth Daily.      • lisinopril (PRINIVIL,ZESTRIL) 10 MG tablet Take 10 mg by mouth Daily.      • omeprazole (priLOSEC) 20 MG capsule Take 20 mg by mouth Daily.      • tamsulosin (FLOMAX) 0.4 MG capsule 24 hr capsule Take 1 capsule by mouth Every Night.        Allergies:    Allergies   Allergen Reactions   • Sulfa Antibiotics Rash       Review of Systems   Constitutional: Negative.    HENT: Negative.    Eyes: Negative.    Respiratory: Negative.    Gastrointestinal: Negative.    Endocrine: Negative.    Genitourinary: Negative.    Musculoskeletal: Negative.    Skin: Negative.    Neurological: Negative.    Hematological: Negative.    Psychiatric/Behavioral: Negative.         Objective    Vital Signs  Temp:  [97.8 °F (36.6 °C)] 97.8 °F (36.6 °C)  Heart Rate:  [79] 79  Resp:  [16] 16  BP: (132)/(69) 132/69  SpO2:  [96 %] 96 %  on   ;      There is no height or weight on file to calculate BMI.    Physical Exam   Constitutional: He is oriented to person, place, and time. He appears well-developed and well-nourished.   HENT:   Head: Normocephalic and atraumatic.   Eyes: Conjunctivae and EOM are normal. Pupils are equal, round, and reactive to light. No scleral icterus.   Neck: Normal range of motion. Neck supple. No JVD present.   Thyroidectomy scar noted with surgical drain in place.   Cardiovascular: Normal rate, regular rhythm and normal heart sounds.    No murmur heard.  Pulmonary/Chest: Effort normal and breath sounds normal. No respiratory distress.   Abdominal: Soft. Bowel sounds are normal. He exhibits no distension. There is no tenderness.   Musculoskeletal: Normal range of motion. He exhibits no edema.   Neurological: He is alert and oriented to person, place, and time.  No cranial nerve deficit.   Skin: Skin is warm and dry.   Psychiatric: He has a normal mood and affect. His behavior is normal. Judgment and thought content normal.   Vitals reviewed.      Results Review:   I reviewed the patient's new clinical results.    Lab Results (last 24 hours)     ** No results found for the last 24 hours. **            No orders to display     Assessment/Plan   Assessment:     Active Hospital Problems (** Indicates Principal Problem)    Diagnosis Date Noted   • **Postsurgical hypoparathyroidism [E89.2] 11/30/2017   • Status post complete thyroidectomy [E89.0] 11/30/2017   • COPD (chronic obstructive pulmonary disease) [J44.9] 11/30/2017   • BPH (benign prostatic hyperplasia) [N40.0] 11/30/2017   • History of nephrectomy [Z90.5] 11/30/2017   • Hypoparathyroidism after procedure [E89.2] 11/30/2017      Resolved Hospital Problems    Diagnosis Date Noted Date Resolved   No resolved problems to display.       Consult Dr. Mchugh and endocrinology for further assistance. Will check PTH and other labs tonight and in the morning. Monitor his calcium. Discussed with Dr. Mchugh who wants him on calcium carbonate 1 g 4 times daily. Will continue his home medications. RN to call Dr. Mchugh for further orders this evening.      I discussed the patients findings and my recommendations with patient, family, nursing staff and consulting provider.          Brian No MD  [facility]  11/30/17  10:37 PM       Attending Attestation (For Attendings USE Only)...

## 2022-09-12 NOTE — TELEPHONE ENCOUNTER
See if he can come in at 11:15 today instead.  If not, have him go ahead and change the dressing with dry gauze to keep it covered until we see him tomorrow.      Spoke with Karey, she is aware that she should continue her medications she is taking and will see what Peg says in 2 days

## 2022-10-20 NOTE — PROGRESS NOTES
"Chief Complaint  Hypertension    Subjective        Jase Cardoza presents to [facility]  History of Present Illness  Pt is here for refills, labs and  HTN- no CP or HA  HLD- on med and is trying to stay active.    Hypothyroidism- stable and needs refills.    Objective   Vital Signs:  /76 (BP Location: Left arm, Patient Position: Sitting, Cuff Size: Adult)   Temp 97.5 °F (36.4 °C) (Temporal)   Resp 18   Ht 180.3 cm (70.98\")   Wt 62.6 kg (138 lb)   BMI 19.26 kg/m²   Estimated body mass index is 19.26 kg/m² as calculated from the following:    Height as of this encounter: 180.3 cm (70.98\").    Weight as of this encounter: 62.6 kg (138 lb).    BMI is within normal parameters. No other follow-up for BMI required.      Physical Exam  Vitals and nursing note reviewed.   Constitutional:       Appearance: Normal appearance. He is well-developed.   Neck:      Thyroid: No thyroid mass or thyromegaly.      Trachea: Trachea normal. No tracheal tenderness or tracheal deviation.   Cardiovascular:      Rate and Rhythm: Normal rate and regular rhythm.      Heart sounds: Normal heart sounds. No murmur heard.  Pulmonary:      Effort: Pulmonary effort is normal. No respiratory distress.      Breath sounds: Normal breath sounds. No stridor. No wheezing or rhonchi.   Neurological:      General: No focal deficit present.      Mental Status: He is alert and oriented to person, place, and time. He is not disoriented.   Psychiatric:         Mood and Affect: Mood normal.         Behavior: Behavior normal.        Result Review :                Assessment and Plan   Diagnoses and all orders for this visit:    1. Essential hypertension (Primary)  -     Comprehensive Metabolic Panel  -     lisinopril-hydrochlorothiazide (Zestoretic) 20-25 MG per tablet; Take 1 tablet by mouth Daily.  Dispense: 90 tablet; Refill: 1  -     amLODIPine (NORVASC) 2.5 MG tablet; Take 1 tablet by mouth Daily.  Dispense: 90 " tablet; Refill: 1    2. Stage 3 chronic kidney disease, unspecified whether stage 3a or 3b CKD (HCC)  -     Comprehensive Metabolic Panel    3. Mixed hyperlipidemia  -     Lipid Panel  -     fenofibrate (TRICOR) 145 MG tablet; Take 1 tablet by mouth Daily.  Dispense: 90 tablet; Refill: 1    4. Post-surgical hypothyroidism  -     TSH    5. Immunization due  -     Fluzone High-Dose 65+yrs ([id number])  -     COVID-19 Bivalent Booster (Pfizer) 12+yrs             Follow Up   Return televisit after 12/13/22 for mwe and then 6 months for regular visit..  Patient was given instructions and counseling regarding his condition or for health maintenance advice. Please see specific information pulled into the AVS if appropriate.     Refills, labs and work on diet and exercise.

## 2022-12-15 PROBLEM — S52.021E: Status: RESOLVED | Noted: 2019-01-14 | Resolved: 2022-01-01

## 2022-12-15 PROBLEM — S52.021B: Status: RESOLVED | Noted: 2018-12-27 | Resolved: 2022-01-01

## 2022-12-15 PROBLEM — H61.23 IMPACTED CERUMEN OF BOTH EARS: Status: RESOLVED | Noted: 2019-11-26 | Resolved: 2022-01-01

## 2022-12-15 PROBLEM — S59.901A INJURY OF RIGHT ELBOW: Status: RESOLVED | Noted: 2019-01-14 | Resolved: 2022-01-01

## 2022-12-15 PROBLEM — S22.20XA CLOSED FRACTURE OF STERNUM: Status: RESOLVED | Noted: 2018-12-27 | Resolved: 2022-01-01

## 2022-12-15 PROBLEM — E83.51 HYPOCALCEMIA: Status: RESOLVED | Noted: 2017-12-01 | Resolved: 2022-01-01
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
## 2022-12-15 NOTE — PROGRESS NOTES
The ABCs of the Annual Wellness Visit  Subsequent Medicare Wellness Visit    Subjective      Jase Cardoza is a 89 y.o. male who presents for a Subsequent Medicare Wellness Visit.  Pt is here MWE  The following portions of the patient's history were reviewed and   updated as appropriate: allergies, current medications, past family history, past medical history, past social history, past surgical history and problem list.    Compared to one year ago, the patient feels his physical   health is the same.    Compared to one year ago, the patient feels his mental   health is the same.    Recent Hospitalizations:  He was not admitted to the hospital during the last year.       Current Medical Providers:  Patient Care Team:  Oxana Wise MD as PCP - General (Family Medicine)    Outpatient Medications Prior to Visit   Medication Sig Dispense Refill   • amLODIPine (NORVASC) 2.5 MG tablet Take 1 tablet by mouth Daily. 90 tablet 1   • fenofibrate (TRICOR) 145 MG tablet Take 1 tablet by mouth Daily. 90 tablet 1   • lisinopril-hydrochlorothiazide (Zestoretic) 20-25 MG per tablet Take 1 tablet by mouth Daily. 90 tablet 1   • Synthroid 137 MCG tablet      • tamsulosin (FLOMAX) 0.4 MG capsule 24 hr capsule Take 1 capsule by mouth Every Night.       No facility-administered medications prior to visit.       No opioid medication identified on active medication list. I have reviewed chart for other potential  high risk medication/s and harmful drug interactions in the elderly.          Aspirin is not on active medication list.  Aspirin use is not indicated based on review of current medical condition/s. Risk of harm outweighs potential benefits.  .    Patient Active Problem List   Diagnosis   • Status post complete thyroidectomy   • Postsurgical hypoparathyroidism (HCC)   • COPD (chronic obstructive pulmonary disease) (HCC)   • BPH (benign prostatic hyperplasia)   • History of nephrectomy   • Essential hypertension   •  "Post-surgical hypothyroidism   • Thyroid nodule   • Stage III chronic kidney disease (HCC)   • Primary osteoarthritis of first carpometacarpal joint of left hand   • Mixed hyperlipidemia   • Hurthle cell adenoma of thyroid   • GERD without esophagitis   • Medicare annual wellness visit, subsequent   • Varicose veins of left lower extremity with pain     Advance Care Planning  Advance Directive is not on file.  ACP discussion was held with the patient during this visit. Patient has an advance directive (not in EMR), copy requested.     Objective    There were no vitals filed for this visit.  Estimated body mass index is 19.26 kg/m² as calculated from the following:    Height as of 10/20/22: 180.3 cm (70.98\").    Weight as of 10/20/22: 62.6 kg (138 lb).    BMI is within normal parameters. No other follow-up for BMI required.      Does the patient have evidence of cognitive impairment?   No    Lab Results   Component Value Date    CHLPL 132 10/20/2022    TRIG 79 10/20/2022    HDL 44 10/20/2022    LDL 72 10/20/2022    VLDL 16 10/20/2022          HEALTH RISK ASSESSMENT    Smoking Status:  Social History     Tobacco Use   Smoking Status Former   • Types: Cigarettes   Smokeless Tobacco Never   Tobacco Comments    quit 70 years ago     Alcohol Consumption:  Social History     Substance and Sexual Activity   Alcohol Use No    Comment: Caffeine use     Fall Risk Screen:    TROY Fall Risk Assessment has not been completed.    Depression Screening:  PHQ-2/PHQ-9 Depression Screening 10/20/2022   Retired PHQ-9 Total Score -   Retired Total Score -   Little Interest or Pleasure in Doing Things 0-->not at all   Feeling Down, Depressed or Hopeless 0-->not at all   PHQ-9: Brief Depression Severity Measure Score 0       Health Habits and Functional and Cognitive Screening:  Functional & Cognitive Status 12/15/2022   Do you have difficulty preparing food and eating? No   Do you have difficulty bathing yourself, getting dressed or " grooming yourself? No   Do you have difficulty using the toilet? No   Do you have difficulty moving around from place to place? No   Do you have trouble with steps or getting out of a bed or a chair? No   Current Diet Well Balanced Diet   Dental Exam Up to date   Eye Exam Up to date   Exercise (times per week) 3 times per week   Current Exercises Include House Cleaning;Walking   Current Exercise Activities Include -   Do you need help using the phone?  No   Are you deaf or do you have serious difficulty hearing?  No   Do you need help with transportation? No   Do you need help shopping? No   Do you need help preparing meals?  No   Do you need help with housework?  No   Do you need help with laundry? No   Do you need help taking your medications? No   Do you need help managing money? No   Do you ever drive or ride in a car without wearing a seat belt? No   Have you felt unusual stress, anger or loneliness in the last month? No   Who do you live with? Alone   If you need help, do you have trouble finding someone available to you? No   Have you been bothered in the last four weeks by sexual problems? No   Do you have difficulty concentrating, remembering or making decisions? No       Age-appropriate Screening Schedule:  Refer to the list below for future screening recommendations based on patient's age, sex and/or medical conditions. Orders for these recommended tests are listed in the plan section. The patient has been provided with a written plan.    Health Maintenance   Topic Date Due   • ZOSTER VACCINE (1 of 2) 02/17/1977   • LIPID PANEL  10/20/2023   • TDAP/TD VACCINES (2 - Td or Tdap) 12/27/2028   • INFLUENZA VACCINE  Completed                CMS Preventative Services Quick Reference  Risk Factors Identified During Encounter:    will work on living will    The above risks/problems have been discussed with the patient.  Pertinent information has been shared with the patient in the After Visit Summary.    Diagnoses  and all orders for this visit:    1. Medicare annual wellness visit, subsequent (Primary)        Follow Up:   Next Medicare Wellness visit to be scheduled in 1 year.      An After Visit Summary and PPPS were made available to the patient.

## (undated) DEVICE — DRSNG PAD ABD 8X10IN STRL

## (undated) DEVICE — HANDPIECE SET WITH COAXIAL HIGH FLOW TIP AND SUCTION TUBE: Brand: INTERPULSE

## (undated) DEVICE — TOWEL,OR,DSP,ST,BLUE,STD,4/PK,20PK/CS: Brand: MEDLINE

## (undated) DEVICE — DRSNG GZ PETROLTM XEROFORM CURAD 1X8IN STRL

## (undated) DEVICE — VIOLET BRAIDED (POLYGLACTIN 910), SYNTHETIC ABSORBABLE SUTURE: Brand: COATED VICRYL

## (undated) DEVICE — PREMIUM WET SKIN PREP TRAY: Brand: MEDLINE INDUSTRIES, INC.

## (undated) DEVICE — DRSNG GZ CURAD XEROFORM NONADHS 5X9IN STRL

## (undated) DEVICE — SUT ETHLN 3/0 PS1 18IN 1663H

## (undated) DEVICE — DRP C/ARM 41X74IN

## (undated) DEVICE — 3M™ IOBAN™ 2 ANTIMICROBIAL INCISE DRAPE 6640EZ: Brand: IOBAN™ 2

## (undated) DEVICE — APPL CHLORAPREP W/TINT 26ML ORNG

## (undated) DEVICE — GLV SURG TRIUMPH CLASSIC PF LTX 9 STRL

## (undated) DEVICE — BNDG ESMARK STRL 6INX12FT LF

## (undated) DEVICE — GLV SURG TRIUMPH CLASSIC PF LTX 8.5 STRL

## (undated) DEVICE — DISPOSABLE TOURNIQUET CUFF SINGLE BLADDER, SINGLE PORT AND QUICK CONNECT CONNECTOR: Brand: COLOR CUFF

## (undated) DEVICE — UNDERCAST PADDING: Brand: DEROYAL

## (undated) DEVICE — BNDG ELAS MATRX  2IN 5YD LF STRL

## (undated) DEVICE — SPNG GZ WOVN 4X4IN 12PLY 10/BX STRL

## (undated) DEVICE — SUT VIC 3/0 CTI 36IN J944H

## (undated) DEVICE — PK ORTHO MAJ 40

## (undated) DEVICE — ANTIBACTERIAL UNDYED BRAIDED (POLYGLACTIN 910), SYNTHETIC ABSORBABLE SUTURE: Brand: COATED VICRYL

## (undated) DEVICE — BNDG ELAS ELITE V/CLOSE 3IN 5YD LF STRL

## (undated) DEVICE — Device

## (undated) DEVICE — GLV SURG SENSICARE POLYISPRN W/ALOE PF LF 9 GRN STRL

## (undated) DEVICE — ENCORE® LATEX ORTHO SIZE 8.5, STERILE LATEX POWDER-FREE SURGICAL GLOVE: Brand: ENCORE

## (undated) DEVICE — STPLR SKIN VISISTAT WD 35CT

## (undated) DEVICE — T-DRAPE,EXTREMITY,STERILE: Brand: MEDLINE

## (undated) DEVICE — UNDYED BRAIDED (POLYGLACTIN 910), SYNTHETIC ABSORBABLE SUTURE: Brand: COATED VICRYL